# Patient Record
Sex: FEMALE | Race: WHITE | Employment: FULL TIME | URBAN - METROPOLITAN AREA
[De-identification: names, ages, dates, MRNs, and addresses within clinical notes are randomized per-mention and may not be internally consistent; named-entity substitution may affect disease eponyms.]

---

## 2017-01-16 ENCOUNTER — ALLSCRIPTS OFFICE VISIT (OUTPATIENT)
Dept: OTHER | Facility: OTHER | Age: 52
End: 2017-01-16

## 2017-01-20 ENCOUNTER — GENERIC CONVERSION - ENCOUNTER (OUTPATIENT)
Dept: OTHER | Facility: OTHER | Age: 52
End: 2017-01-20

## 2017-02-03 ENCOUNTER — ALLSCRIPTS OFFICE VISIT (OUTPATIENT)
Dept: OTHER | Facility: OTHER | Age: 52
End: 2017-02-03

## 2017-02-27 ENCOUNTER — ALLSCRIPTS OFFICE VISIT (OUTPATIENT)
Dept: OTHER | Facility: OTHER | Age: 52
End: 2017-02-27

## 2017-03-01 DIAGNOSIS — J31.0 CHRONIC RHINITIS: ICD-10-CM

## 2017-03-02 ENCOUNTER — HOSPITAL ENCOUNTER (OUTPATIENT)
Dept: RADIOLOGY | Facility: HOSPITAL | Age: 52
Discharge: HOME | End: 2017-03-02
Payer: COMMERCIAL

## 2017-03-02 DIAGNOSIS — J31.0 CHRONIC RHINITIS: ICD-10-CM

## 2017-03-02 PROCEDURE — 70220 X-RAY EXAM OF SINUSES: CPT

## 2017-03-03 ENCOUNTER — GENERIC CONVERSION - ENCOUNTER (OUTPATIENT)
Dept: OTHER | Facility: OTHER | Age: 52
End: 2017-03-03

## 2017-07-18 ENCOUNTER — TRANSCRIBE ORDERS (OUTPATIENT)
Dept: ADMINISTRATIVE | Facility: HOSPITAL | Age: 52
End: 2017-07-18

## 2017-07-18 ENCOUNTER — APPOINTMENT (OUTPATIENT)
Dept: LAB | Facility: HOSPITAL | Age: 52
End: 2017-07-18
Payer: COMMERCIAL

## 2017-07-18 DIAGNOSIS — K51.919 MILD CHRONIC ULCERATIVE COLITIS, UNSPECIFIED COMPLICATION (HCC): Primary | ICD-10-CM

## 2017-07-18 DIAGNOSIS — K51.919 MILD CHRONIC ULCERATIVE COLITIS, UNSPECIFIED COMPLICATION (HCC): ICD-10-CM

## 2017-07-18 LAB
ALBUMIN SERPL BCP-MCNC: 3.4 G/DL (ref 3.5–5)
ALP SERPL-CCNC: 124 U/L (ref 46–116)
ALT SERPL W P-5'-P-CCNC: 18 U/L (ref 12–78)
ANION GAP SERPL CALCULATED.3IONS-SCNC: 8 MMOL/L (ref 4–13)
AST SERPL W P-5'-P-CCNC: 12 U/L (ref 5–45)
BASOPHILS # BLD AUTO: 0.05 THOUSANDS/ΜL (ref 0–0.1)
BASOPHILS NFR BLD AUTO: 1 % (ref 0–1)
BILIRUB SERPL-MCNC: 0.14 MG/DL (ref 0.2–1)
BUN SERPL-MCNC: 16 MG/DL (ref 5–25)
CALCIUM SERPL-MCNC: 8.4 MG/DL (ref 8.3–10.1)
CHLORIDE SERPL-SCNC: 104 MMOL/L (ref 100–108)
CO2 SERPL-SCNC: 26 MMOL/L (ref 21–32)
CREAT SERPL-MCNC: 0.89 MG/DL (ref 0.6–1.3)
CRP SERPL QL: 18.5 MG/L
EOSINOPHIL # BLD AUTO: 0.31 THOUSAND/ΜL (ref 0–0.61)
EOSINOPHIL NFR BLD AUTO: 3 % (ref 0–6)
ERYTHROCYTE [DISTWIDTH] IN BLOOD BY AUTOMATED COUNT: 20 % (ref 11.6–15.1)
GFR SERPL CREATININE-BSD FRML MDRD: >60 ML/MIN/1.73SQ M
GLUCOSE SERPL-MCNC: 100 MG/DL (ref 65–140)
HCT VFR BLD AUTO: 26 % (ref 34.8–46.1)
HGB BLD-MCNC: 7.4 G/DL (ref 11.5–15.4)
LYMPHOCYTES # BLD AUTO: 2.61 THOUSANDS/ΜL (ref 0.6–4.47)
LYMPHOCYTES NFR BLD AUTO: 26 % (ref 14–44)
MCH RBC QN AUTO: 19.6 PG (ref 26.8–34.3)
MCHC RBC AUTO-ENTMCNC: 28.5 G/DL (ref 31.4–37.4)
MCV RBC AUTO: 69 FL (ref 82–98)
MONOCYTES # BLD AUTO: 0.66 THOUSAND/ΜL (ref 0.17–1.22)
MONOCYTES NFR BLD AUTO: 7 % (ref 4–12)
NEUTROPHILS # BLD AUTO: 6.45 THOUSANDS/ΜL (ref 1.85–7.62)
NEUTS SEG NFR BLD AUTO: 63 % (ref 43–75)
NRBC BLD AUTO-RTO: 0 /100 WBCS
PLATELET # BLD AUTO: 387 THOUSANDS/UL (ref 149–390)
PMV BLD AUTO: 8.6 FL (ref 8.9–12.7)
POTASSIUM SERPL-SCNC: 3.5 MMOL/L (ref 3.5–5.3)
PROT SERPL-MCNC: 7.6 G/DL (ref 6.4–8.2)
RBC # BLD AUTO: 3.78 MILLION/UL (ref 3.81–5.12)
SODIUM SERPL-SCNC: 138 MMOL/L (ref 136–145)
WBC # BLD AUTO: 10.08 THOUSAND/UL (ref 4.31–10.16)

## 2017-07-18 PROCEDURE — 85025 COMPLETE CBC W/AUTO DIFF WBC: CPT

## 2017-07-18 PROCEDURE — 86140 C-REACTIVE PROTEIN: CPT

## 2017-07-18 PROCEDURE — 36415 COLL VENOUS BLD VENIPUNCTURE: CPT

## 2017-07-18 PROCEDURE — 80053 COMPREHEN METABOLIC PANEL: CPT

## 2017-07-20 ENCOUNTER — GENERIC CONVERSION - ENCOUNTER (OUTPATIENT)
Dept: OTHER | Facility: OTHER | Age: 52
End: 2017-07-20

## 2017-09-18 ENCOUNTER — ALLSCRIPTS OFFICE VISIT (OUTPATIENT)
Dept: OTHER | Facility: OTHER | Age: 52
End: 2017-09-18

## 2017-09-18 DIAGNOSIS — Z00.00 ENCOUNTER FOR GENERAL ADULT MEDICAL EXAMINATION WITHOUT ABNORMAL FINDINGS: ICD-10-CM

## 2017-10-11 ENCOUNTER — TRANSCRIBE ORDERS (OUTPATIENT)
Dept: ADMINISTRATIVE | Facility: HOSPITAL | Age: 52
End: 2017-10-11

## 2017-10-11 ENCOUNTER — APPOINTMENT (OUTPATIENT)
Dept: LAB | Facility: HOSPITAL | Age: 52
End: 2017-10-11
Payer: COMMERCIAL

## 2017-10-11 ENCOUNTER — GENERIC CONVERSION - ENCOUNTER (OUTPATIENT)
Dept: OTHER | Facility: OTHER | Age: 52
End: 2017-10-11

## 2017-10-11 DIAGNOSIS — D50.9 NORMOCYTIC HYPOCHROMIC ANEMIA: ICD-10-CM

## 2017-10-11 DIAGNOSIS — E66.01 MORBID OBESITY (HCC): ICD-10-CM

## 2017-10-11 DIAGNOSIS — F45.8 ANXIETY HYPERVENTILATION: ICD-10-CM

## 2017-10-11 DIAGNOSIS — Z00.00 ENCOUNTER FOR GENERAL ADULT MEDICAL EXAMINATION WITHOUT ABNORMAL FINDINGS: ICD-10-CM

## 2017-10-11 DIAGNOSIS — K51.919 MILD CHRONIC ULCERATIVE COLITIS WITH COMPLICATION (HCC): ICD-10-CM

## 2017-10-11 DIAGNOSIS — E66.01 MORBID OBESITY (HCC): Primary | ICD-10-CM

## 2017-10-11 DIAGNOSIS — D50.9 NORMOCYTIC HYPOCHROMIC ANEMIA: Primary | ICD-10-CM

## 2017-10-11 DIAGNOSIS — F41.9 ANXIETY HYPERVENTILATION: ICD-10-CM

## 2017-10-11 LAB
ALBUMIN SERPL BCP-MCNC: 3.4 G/DL (ref 3.5–5)
ALP SERPL-CCNC: 114 U/L (ref 46–116)
ALT SERPL W P-5'-P-CCNC: 17 U/L (ref 12–78)
ANION GAP SERPL CALCULATED.3IONS-SCNC: 5 MMOL/L (ref 4–13)
AST SERPL W P-5'-P-CCNC: 12 U/L (ref 5–45)
BILIRUB SERPL-MCNC: 0.19 MG/DL (ref 0.2–1)
BUN SERPL-MCNC: 14 MG/DL (ref 5–25)
CALCIUM SERPL-MCNC: 8.8 MG/DL (ref 8.3–10.1)
CHLORIDE SERPL-SCNC: 105 MMOL/L (ref 100–108)
CHOLEST SERPL-MCNC: 217 MG/DL (ref 50–200)
CO2 SERPL-SCNC: 29 MMOL/L (ref 21–32)
CORTIS AM PEAK SERPL-MCNC: 13.5 UG/DL (ref 4.2–22.4)
CREAT SERPL-MCNC: 0.89 MG/DL (ref 0.6–1.3)
CRP SERPL QL: 21.9 MG/L
ERYTHROCYTE [DISTWIDTH] IN BLOOD BY AUTOMATED COUNT: 19.3 % (ref 11.6–15.1)
FERRITIN SERPL-MCNC: 9 NG/ML (ref 8–388)
GFR SERPL CREATININE-BSD FRML MDRD: 76 ML/MIN/1.73SQ M
GLUCOSE P FAST SERPL-MCNC: 91 MG/DL (ref 65–99)
HCT VFR BLD AUTO: 39 % (ref 34.8–46.1)
HDLC SERPL-MCNC: 55 MG/DL (ref 40–60)
HGB BLD-MCNC: 11.9 G/DL (ref 11.5–15.4)
INSULIN SERPL-ACNC: 16.3 MU/L (ref 3–25)
IRON SATN MFR SERPL: 10 %
IRON SERPL-MCNC: 34 UG/DL (ref 50–170)
LDLC SERPL CALC-MCNC: 141 MG/DL (ref 0–100)
MCH RBC QN AUTO: 24.9 PG (ref 26.8–34.3)
MCHC RBC AUTO-ENTMCNC: 30.5 G/DL (ref 31.4–37.4)
MCV RBC AUTO: 82 FL (ref 82–98)
PLATELET # BLD AUTO: 353 THOUSANDS/UL (ref 149–390)
PMV BLD AUTO: 9.3 FL (ref 8.9–12.7)
POTASSIUM SERPL-SCNC: 4 MMOL/L (ref 3.5–5.3)
PROT SERPL-MCNC: 7.6 G/DL (ref 6.4–8.2)
RBC # BLD AUTO: 4.78 MILLION/UL (ref 3.81–5.12)
SODIUM SERPL-SCNC: 139 MMOL/L (ref 136–145)
TIBC SERPL-MCNC: 332 UG/DL (ref 250–450)
TRIGL SERPL-MCNC: 107 MG/DL
TSH SERPL DL<=0.05 MIU/L-ACNC: 2.66 UIU/ML (ref 0.36–3.74)
WBC # BLD AUTO: 6.31 THOUSAND/UL (ref 4.31–10.16)

## 2017-10-11 PROCEDURE — 36415 COLL VENOUS BLD VENIPUNCTURE: CPT

## 2017-10-11 PROCEDURE — 85027 COMPLETE CBC AUTOMATED: CPT

## 2017-10-11 PROCEDURE — 83550 IRON BINDING TEST: CPT

## 2017-10-11 PROCEDURE — 83525 ASSAY OF INSULIN: CPT

## 2017-10-11 PROCEDURE — 80061 LIPID PANEL: CPT

## 2017-10-11 PROCEDURE — 82533 TOTAL CORTISOL: CPT

## 2017-10-11 PROCEDURE — 84443 ASSAY THYROID STIM HORMONE: CPT

## 2017-10-11 PROCEDURE — 82728 ASSAY OF FERRITIN: CPT

## 2017-10-11 PROCEDURE — 86140 C-REACTIVE PROTEIN: CPT

## 2017-10-11 PROCEDURE — 83540 ASSAY OF IRON: CPT

## 2017-10-11 PROCEDURE — 80053 COMPREHEN METABOLIC PANEL: CPT

## 2017-10-12 ENCOUNTER — GENERIC CONVERSION - ENCOUNTER (OUTPATIENT)
Dept: OTHER | Facility: OTHER | Age: 52
End: 2017-10-12

## 2017-11-27 ENCOUNTER — GENERIC CONVERSION - ENCOUNTER (OUTPATIENT)
Dept: OTHER | Facility: OTHER | Age: 52
End: 2017-11-27

## 2017-11-30 ENCOUNTER — ALLSCRIPTS OFFICE VISIT (OUTPATIENT)
Dept: OTHER | Facility: OTHER | Age: 52
End: 2017-11-30

## 2017-12-05 NOTE — PROGRESS NOTES
Assessment    1  Acute bronchitis, unspecified organism (466 0) (J20 9)    Plan  Acute bronchitis, unspecified organism    · Azithromycin 250 MG Oral Tablet; TAKE 2 TABLETS ON DAY 1 THEN TAKE 1  TABLET A DAY FOR 4 DAYS   · Montelukast Sodium 10 MG Oral Tablet (Singulair); take 1 tablet by mouth once  daily    Discussion/Summary    See how pt does on meds  Possible side effects of new medications were reviewed with the patient/guardian today  The treatment plan was reviewed with the patient/guardian  The patient/guardian understands and agrees with the treatment plan      Chief Complaint    1  Cough  Patient present today to follow up on her cough  Per patient she is not improving and she is still wheezing occasionally  History of Present Illness  SAÚL Avila presents with complaints of cough  Associated symptoms include wheezing, sore throat and hoarseness, but no fever and no stuffy nose  Review of Systems    Constitutional: feeling poorly, but not feeling tired  ENT: hoarseness, but no nasal discharge  Respiratory: cough and wheezing  Neurological: no headache  Active Problems    1  Acute pharyngitis due to other specified organisms (462) (J02 8)   2  Anxiety (300 00) (F41 9)   3  Dermatitis (692 9) (L30 9)   4  Elevated blood pressure reading (796 2) (R03 0)   5  Encounter for screening mammogram for breast cancer (V76 12) (Z12 31)   6  Morbid obesity with BMI of 40 0-44 9, adult (278 01,V85 41) (E66 01,Z68 41)   7  Other iron deficiency anemia (280 8) (D50 8)   8  Pancolitis (556 6) (K51 00)   9  Rhinitis (472 0) (J31 0)   10  Visual disturbance (368 9) (H53 9)    Past Medical History    1  History of Candidiasis (112 9) (B37 9)    The active problems and past medical history were reviewed and updated today  Surgical History    1  History of  Section   2  History of Excision Of Baker's Cyst   3  History of Knee Surgery   4   History of Laparoscopy (Diagnostic)    The surgical history was reviewed and updated today  Family History  Mother    1  Family history of Hypertension  Father    2  Family history of Diabetes   3  Family history of cataracts (V19 19) (Z83 518)   4  Family history of Hypertension  Brother    5  Family history of glaucoma (V19 11) (Z83 511)    The family history was reviewed and updated today  Social History    · Always uses seat belt   · Former smoker (V15 82) (T73 554)   · Full-time employment   · Lives with family   ·    · No advance directives (V49 89) (Z78 9)   · No living will   · One child   · Denied: Power of  in existence   · [employer]   · Supportive and safe  The social history was reviewed and updated today  Current Meds   1  Apriso 0 375 GM Oral Capsule Extended Release 24 Hour; take 1 capsule daily; Therapy: 20OJR3012 to (Evaluate:27Wxa0963); Last Rx:13Nov2015 Ordered   2  LORazepam 1 MG Oral Tablet; TAKE 1 TABLET BY MOUTH TWICE DAILY AS NEEDED; Therapy: 21DQQ7567 to (Evaluate:24Scj2738)  Requested for: 45Tyx7186; Last   Rx:04Yaz0941 Ordered   3  Prevacid 24HR 15 MG Oral Capsule Delayed Release; TAKE 1 CAPSULE EVERY 12   HOURS Recorded    The medication list was reviewed and updated today  Allergies    1  PredniSONE TABS   2  Lexapro TABS   3  Sulfa Drugs    Vitals  Vital Signs    Recorded: 29XRV8550 02:04PM Recorded: 46XPV1811 01:38PM   Temperature  98 F, Temporal   Heart Rate  82   Respiration  18   Systolic 635 966   Diastolic 92 98   Height  5 ft 3 in   Weight  239 lb 6 oz   BMI Calculated  42 4   BSA Calculated  2 09     Physical Exam    Constitutional   General appearance: Abnormal   acutely ill, obese and appears tired  Ears, Nose, Mouth, and Throat   Otoscopic examination: Tympanic membranes translucent with normal light reflex  Canals patent without erythema  Oropharynx: Normal with no erythema, edema, exudate or lesions      Pulmonary   Auscultation of lungs: Abnormal   diffuse wheezing bilaterally  Cardiovascular   Auscultation of heart: Normal rate and rhythm, normal S1 and S2, without murmurs  Lymphatic   Palpation of lymph nodes in neck: No lymphadenopathy  Signatures   Electronically signed by :  Iris Beasley MD; Nov 30 2017  2:06PM EST                       (Author)

## 2018-01-10 NOTE — PROGRESS NOTES
Assessment    1  Bilateral impacted cerumen (380 4) (H61 23)    Plan  Bilateral impacted cerumen    · Removal Impacted Cerumen one or both ears - POC; Status:Complete;   Done:  04KRN6913   · Follow-up PRN Evaluation and Treatment  Follow-up  Status: Complete  Done:  40RHR2286 03:44PM    Chief Complaint  L ear blocked      History of Present Illness  Ear Fullness: The patient is being seen for an initial evaluation of ear fullness  Symptoms:  ear fullness and ear plugging  Associated symptoms:  no sore throat and no nasal congestion  Review of Systems    Constitutional: no fever and no chills  ENT: hearing loss and nasal discharge, but no earache  Respiratory: no cough  Gastrointestinal: abdominal pain  Neurological: no headache  Active Problems    1  Anxiety (300 00) (F41 9)   2  Dermatitis (692 9) (L30 9)   3  Encounter for screening mammogram for breast cancer (V76 12) (Z12 31)   4  Pancolitis (556 6) (K51 90)   5  URI (upper respiratory infection) (465 9) (J06 9)    Past Medical History    1  History of Candidiasis (112 9) (B37 9)  Active Problems And Past Medical History Reviewed: The active problems and past medical history were reviewed and updated today  Family History    1  Family history of Hypertension    2  Family history of Diabetes   3  Family history of Hypertension  Family History Reviewed: The family history was reviewed and updated today  Social History    · Former smoker (O44 80) (G25 661)   · Quit    · Full-time employment   ·    · One child  The social history was reviewed and updated today  Surgical History    1  History of  Section   2  History of Excision Of Baker's Cyst   3  History of Knee Surgery   4  History of Laparoscopy (Diagnostic)  Surgical History Reviewed: The surgical history was reviewed and updated today  Current Meds   1  Apriso 0 375 GM Oral Capsule Extended Release 24 Hour; take 1 capsule daily;    Therapy: 55BHH4921 to (Evaluate:99Rhm2267); Last Rx:13Nov2015 Ordered   2  LORazepam 1 MG Oral Tablet; TAKE ONE TABLET BY MOUTH TWO TIMES DAILY AS   NEEDED; Therapy: 92LRE3437 to ([facility] [place])  Requested for: 45GJC6697; Last   Rx:30Nov2015 Ordered   3  Prevacid 24HR 15 MG Oral Capsule Delayed Release; TAKE 1 CAPSULE EVERY 12   HOURS Recorded    The medication list was reviewed and updated today  Allergies    1  PredniSONE TABS   2  Lexapro TABS   3  Sulfa Drugs    Vitals   Recorded: 87RUL5327 02:58PM   Temperature 98 7 F   Heart Rate 84   Systolic 097   Diastolic 76   Height 5 ft 3 in   Weight 241 lb    BMI Calculated 42 69   BSA Calculated 2 1     Physical Exam    Constitutional   General appearance: Abnormal   obese  Ears, Nose, Mouth, and Throat   Otoscopic examination: Abnormal   The right tympanic membrane was obscured  The left tympanic membrane was obscured  The right external canal had a cerumen impaction  The left external canal had a cerumen impaction  clear after irrigation  Oropharynx: Normal with no erythema, edema, exudate or lesions  Pulmonary   Auscultation of lungs: Clear to auscultation  Lymphatic   Palpation of lymph nodes in neck: No lymphadenopathy  Procedure            Procedure: cerumen removal    Indication: tympanic membrane(s) could not be visualized in both ears  Procedure Note: The procedure was performed by MA and lasted 10 minute(s)  A otoscope and speculum was placed in the ear canal(s) to visualize the ear canal debris  The ear was cleaned by using warm water irrigation  The procedure was successful  Post-Procedure:   Patient Status: the patient tolerated the procedure well  Complications: there were no complications  Follow-up as needed  Signatures   Electronically signed by :  Karen Weeks MD; Jan 22 2016  3:45PM EST                       (Author)

## 2018-01-12 VITALS
DIASTOLIC BLOOD PRESSURE: 82 MMHG | HEIGHT: 63 IN | BODY MASS INDEX: 42.11 KG/M2 | HEART RATE: 96 BPM | WEIGHT: 237.63 LBS | SYSTOLIC BLOOD PRESSURE: 110 MMHG

## 2018-01-12 VITALS
TEMPERATURE: 99.2 F | HEIGHT: 63 IN | BODY MASS INDEX: 42.11 KG/M2 | SYSTOLIC BLOOD PRESSURE: 110 MMHG | HEART RATE: 90 BPM | WEIGHT: 237.63 LBS | DIASTOLIC BLOOD PRESSURE: 72 MMHG

## 2018-01-13 VITALS
SYSTOLIC BLOOD PRESSURE: 128 MMHG | WEIGHT: 239.38 LBS | DIASTOLIC BLOOD PRESSURE: 92 MMHG | HEART RATE: 82 BPM | HEIGHT: 63 IN | BODY MASS INDEX: 42.41 KG/M2 | RESPIRATION RATE: 18 BRPM | TEMPERATURE: 98 F

## 2018-01-13 VITALS
TEMPERATURE: 97.1 F | SYSTOLIC BLOOD PRESSURE: 122 MMHG | HEIGHT: 63 IN | RESPIRATION RATE: 16 BRPM | BODY MASS INDEX: 41.15 KG/M2 | WEIGHT: 232.25 LBS | HEART RATE: 84 BPM | DIASTOLIC BLOOD PRESSURE: 90 MMHG

## 2018-01-14 VITALS
DIASTOLIC BLOOD PRESSURE: 78 MMHG | HEIGHT: 63 IN | SYSTOLIC BLOOD PRESSURE: 120 MMHG | WEIGHT: 237.63 LBS | BODY MASS INDEX: 42.11 KG/M2 | HEART RATE: 76 BPM

## 2018-01-16 NOTE — RESULT NOTES
Verified Results  * XR SINUSES ROUTINE 3+ VIEWS 82XEN8344 02:38PM Paula Senior Order Number: [id number]     Test Name Result Flag Reference   XR SINUSES ROUTINE 3+ VIEWS (Report)     PARANASAL SINUSES     INDICATION: Dx  chronic rhinitis  Sinus pressure, headache, left ear pain  COMPARISON: Sinus plain films from 7/20/2007     VIEWS: 5     IMAGES: 5     FINDINGS:     No evidence of sinus opacification or air-fluid levels  No lytic or blastic lesions are identified  IMPRESSION:     No evidence of sinusitis         Workstation performed: [id number]     Signed by:   Martinez Simmons MD   3/3/17

## 2018-01-16 NOTE — RESULT NOTES
Verified Results  (1) CORTISOL AM SPECIMEN 28THW0588 07:47AM Emiliano Brow     Test Name Result Flag Reference   CORTISO AM SPEC 13 5 ug/dL  4 2-22 4   Reference ranges established for specimens drawn between 7 and 9 am  Results may be inaccurate if timing is not correct  (1) TSH 11Oct2017 07:37AM Emiliano Brow     Test Name Result Flag Reference   TSH 2 658 uIU/mL  0 358-3 740   This is a patient instruction: This test is non-fasting  Please drink two glasses of water morning of bloodwork  Patients undergoing fluorescein dye angiography may retain small amounts of fluorescein in the body for 48-72 hours post procedure  Samples containing fluorescein can produce falsely depressed TSH values  If the patient had this procedure,a specimen should be resubmitted post fluorescein clearance            The recommended reference ranges for TSH during pregnancy are as follows:  First trimester 0 1 to 2 5 uIU/mL  Second trimester  0 2 to 3 0 uIU/mL  Third trimester 0 3 to 3 0 uIU/m     (1) INSULIN, FASTING 11Oct2017 07:37AM Emiliano Brow     Test Name Result Flag Reference   INSULIN, FASTING 16 3 mU/L  3 0-25 0

## 2018-01-18 NOTE — RESULT NOTES
Verified Results  (1) LIPID PANEL FASTING W DIRECT LDL REFLEX 11Oct2017 07:37AM [employer] Order Number: [id number]     Test Name Result Flag Reference   CHOLESTEROL 217 mg/dL H    LDL CHOLESTEROL CALCULATED 141 mg/dL H 0-100   Triglyceride:        Normal <150 mg/dl   Borderline High 150-199 mg/dl   High 200-499 mg/dl   Very High >499 mg/dl      Cholesterol:       Desirable <200 mg/dl    Borderline High 200-239 mg/dl    High >239 mg/dl      HDL Cholesterol:       High>59 mg/dL    Low <41 mg/dL      HDL Cholesterol:       High>59 mg/dL    Low <41 mg/dL      This screening LDL is a calculated result  It does not have the accuracy of the Direct Measured LDL in the monitoring of patients with hyperlipidemia and/or statin therapy  Direct Measure LDL (FDY675) must be ordered separately in these patients  TRIGLYCERIDES 107 mg/dL  <=150   Specimen collection should occur prior to N-Acetylcysteine or Metamizole administration due to the potential for falsely depressed results  HDL,DIRECT 55 mg/dL  40-60   Specimen collection should occur prior to Metamizole administration due to the potential for falsley depressed results  (1) COMPREHENSIVE METABOLIC PANEL 51DZE9311 27:64BE [employer] Order Number: [id number]     Test Name Result Flag Reference   SODIUM 139 mmol/L  136-145   POTASSIUM 4 0 mmol/L  3 5-5 3   CHLORIDE 105 mmol/L  100-108   CARBON DIOXIDE 29 mmol/L  21-32   ANION GAP (CALC) 5 mmol/L  4-13   BLOOD UREA NITROGEN 14 mg/dL  5-25   CREATININE 0 89 mg/dL  0 60-1 30   Standardized to IDMS reference method   CALCIUM 8 8 mg/dL  8 3-10 1   BILI, TOTAL 0 19 mg/dL L 0 20-1 00   ALK PHOSPHATAS 114 U/L     ALT (SGPT) 17 U/L  12-78   Specimen collection should occur prior to Sulfasalazine administration due to the potential for falsely depressed results     AST(SGOT) 12 U/L  5-45   Specimen collection should occur prior to Sulfasalazine administration due to the potential for falsely depressed results  ALBUMIN 3 4 g/dL L 3 5-5 0   TOTAL PROTEIN 7 6 g/dL  6 4-8 2   eGFR 76 ml/min/1 73sq m     [facility] Disease Education Program recommendations are as follows:  GFR calculation is accurate only with a steady state creatinine  Chronic Kidney disease less than 60 ml/min/1 73 sq  meters  Kidney failure less than 15 ml/min/1 73 sq  meters  GLUCOSE FASTING 91 mg/dL  65-99   Specimen collection should occur prior to Sulfasalazine administration due to the potential for falsely depressed results  Specimen collection should occur prior to Sulfapyridine administration due to the potential for falsely elevated results

## 2018-01-22 VITALS
TEMPERATURE: 98.7 F | HEART RATE: 86 BPM | BODY MASS INDEX: 42.19 KG/M2 | DIASTOLIC BLOOD PRESSURE: 100 MMHG | WEIGHT: 238.13 LBS | RESPIRATION RATE: 16 BRPM | SYSTOLIC BLOOD PRESSURE: 142 MMHG | HEIGHT: 63 IN

## 2018-02-07 ENCOUNTER — TELEPHONE (OUTPATIENT)
Dept: FAMILY MEDICINE CLINIC | Facility: CLINIC | Age: 53
End: 2018-02-07

## 2018-02-07 NOTE — TELEPHONE ENCOUNTER
Her [facility] paperwork  on 2018  She stated she needs for you to approve another year  She also stated you have all the information and paperwork  Additionally, she asked this be done quickly or she will have to go through the entire process again

## 2018-02-12 ENCOUNTER — TELEPHONE (OUTPATIENT)
Dept: FAMILY MEDICINE CLINIC | Facility: CLINIC | Age: 53
End: 2018-02-12

## 2018-02-12 NOTE — TELEPHONE ENCOUNTER
Patient states you need to redo her fmla form because the last one was closed  She will sent form to email

## 2018-02-13 ENCOUNTER — TELEPHONE (OUTPATIENT)
Dept: FAMILY MEDICINE CLINIC | Facility: CLINIC | Age: 53
End: 2018-02-13

## 2018-02-15 ENCOUNTER — TELEPHONE (OUTPATIENT)
Dept: FAMILY MEDICINE CLINIC | Facility: CLINIC | Age: 53
End: 2018-02-15

## 2018-02-15 NOTE — TELEPHONE ENCOUNTER
Patient called in and wanted to inform you that you do not need to fill out her fmla forms  She was on the phone w/ her hr dept and it was a mistake on their end

## 2018-03-15 DIAGNOSIS — F41.9 ANXIETY: Primary | ICD-10-CM

## 2018-03-15 RX ORDER — LORAZEPAM 1 MG/1
TABLET ORAL
Qty: 60 TABLET | Refills: 2 | Status: SHIPPED | OUTPATIENT
Start: 2018-03-15 | End: 2018-06-06 | Stop reason: SDUPTHER

## 2018-03-16 ENCOUNTER — TELEPHONE (OUTPATIENT)
Dept: FAMILY MEDICINE CLINIC | Facility: CLINIC | Age: 53
End: 2018-03-16

## 2018-03-16 NOTE — TELEPHONE ENCOUNTER
I don't have a problem but her daughter does live locally here and if daughter can  that would be preferred, check with Idalmis Hope

## 2018-03-16 NOTE — TELEPHONE ENCOUNTER
Ida Porter called back and stated her daughter stays with her over the weekends so she is not in the area on fri, sat, sun  I called and left message for Ligia Baeza to call me back  If she approves is it okay to fax rx to pharmacy?

## 2018-03-16 NOTE — TELEPHONE ENCOUNTER
Tony Christiansen returned my call  She said it was okay since her daughter was out of the area  I faxed to [facility] in [place], [place] at [phone]  I left a message on pt's vm notifying her it was faxed successfully

## 2018-03-16 NOTE — TELEPHONE ENCOUNTER
Dr Chica Quiroz and Brandon Day-    Are we allowed to make a one time exception to fax patients lorazepam to her pharmacy in [place], [place]? She is working late today and tomorrow and will be out this weekend  Pls advise  Thank you

## 2018-05-18 ENCOUNTER — TELEPHONE (OUTPATIENT)
Dept: FAMILY MEDICINE CLINIC | Facility: CLINIC | Age: 53
End: 2018-05-18

## 2018-05-18 NOTE — TELEPHONE ENCOUNTER
Per [facility] pt  Needs an appt  To complete paperwork that was sent to us  lmctb to schedule appt  Paperwork in brown bin

## 2018-06-06 ENCOUNTER — OFFICE VISIT (OUTPATIENT)
Dept: FAMILY MEDICINE CLINIC | Facility: CLINIC | Age: 53
End: 2018-06-06
Payer: COMMERCIAL

## 2018-06-06 VITALS
TEMPERATURE: 97.2 F | DIASTOLIC BLOOD PRESSURE: 82 MMHG | SYSTOLIC BLOOD PRESSURE: 130 MMHG | BODY MASS INDEX: 41.16 KG/M2 | WEIGHT: 241.13 LBS | HEART RATE: 86 BPM | RESPIRATION RATE: 16 BRPM | HEIGHT: 64 IN

## 2018-06-06 DIAGNOSIS — Z12.31 ENCOUNTER FOR SCREENING MAMMOGRAM FOR BREAST CANCER: Primary | ICD-10-CM

## 2018-06-06 DIAGNOSIS — F41.9 ANXIETY: ICD-10-CM

## 2018-06-06 DIAGNOSIS — E78.49 OTHER HYPERLIPIDEMIA: ICD-10-CM

## 2018-06-06 DIAGNOSIS — K51.00 PANCOLITIS (HCC): ICD-10-CM

## 2018-06-06 PROBLEM — E66.01 MORBID OBESITY WITH BMI OF 40.0-44.9, ADULT (HCC): Status: ACTIVE | Noted: 2017-09-18

## 2018-06-06 PROCEDURE — 99214 OFFICE O/P EST MOD 30 MIN: CPT | Performed by: FAMILY MEDICINE

## 2018-06-06 RX ORDER — LORAZEPAM 1 MG/1
1 TABLET ORAL 2 TIMES DAILY PRN
Qty: 60 TABLET | Refills: 1 | Status: SHIPPED | OUTPATIENT
Start: 2018-06-06 | End: 2018-09-14 | Stop reason: SDUPTHER

## 2018-06-06 RX ORDER — LORAZEPAM 1 MG/1
1 TABLET ORAL 2 TIMES DAILY PRN
COMMUNITY
Start: 2014-08-11 | End: 2018-06-06 | Stop reason: SDUPTHER

## 2018-06-06 RX ORDER — MESALAMINE 375 MG/1
1 CAPSULE, EXTENDED RELEASE ORAL DAILY
COMMUNITY
Start: 2015-11-13 | End: 2019-02-13

## 2018-06-06 RX ORDER — LANSOPRAZOLE 15 MG/1
1 CAPSULE, DELAYED RELEASE ORAL EVERY 12 HOURS
COMMUNITY
End: 2020-02-05 | Stop reason: CLARIF

## 2018-06-06 RX ORDER — AZITHROMYCIN 250 MG/1
TABLET, FILM COATED ORAL DAILY
COMMUNITY
Start: 2017-11-30 | End: 2018-06-06 | Stop reason: CLARIF

## 2018-06-06 RX ORDER — MONTELUKAST SODIUM 10 MG/1
1 TABLET ORAL DAILY
COMMUNITY
Start: 2017-11-30 | End: 2018-06-06 | Stop reason: CLARIF

## 2018-06-06 NOTE — PROGRESS NOTES
Assessment/Plan:    Pancolitis ([address] )  Await lab    Other hyperlipidemia  Await lab       Diagnoses and all orders for this visit:    Encounter for screening mammogram for breast cancer  -     Mammo screening bilateral w 3d & cad; Future    Other hyperlipidemia  -     Lipid Panel with Direct LDL reflex; Future  -     Comprehensive metabolic panel; Future  -     TSH, 3rd generation; Future  -     Urinalysis with microscopic    Pancolitis (HCC)  -     CBC and differential; Future    Anxiety  -     LORazepam (ATIVAN) 1 mg tablet; Take 1 tablet (1 mg total) by mouth 2 (two) times a day as needed for anxiety          Subjective:      Patient ID: Martha Porras is a 50 y o  female  Abdominal Pain   This is a chronic (has pancolitis) problem  The current episode started more than 1 year ago  The onset quality is sudden  The problem occurs intermittently  The problem has been unchanged  The pain is located in the generalized abdominal region  The pain is at a severity of 3/10  The pain is mild  The quality of the pain is colicky  The abdominal pain does not radiate  Associated symptoms include diarrhea, flatus and nausea  Pertinent negatives include no constipation, headaches, vomiting or weight loss  Nothing aggravates the pain  Relieved by: medications  Treatments tried: medication  The treatment provided moderate relief  Prior diagnostic workup includes GI consult, lower endoscopy, upper endoscopy and CT scan  The following portions of the patient's history were reviewed and updated as appropriate: allergies, current medications, past family history, past medical history, past social history, past surgical history and problem list     Review of Systems   Constitutional: Negative for activity change, appetite change, unexpected weight change and weight loss  Respiratory: Negative for shortness of breath  Cardiovascular: Negative for chest pain     Gastrointestinal: Positive for abdominal pain, diarrhea, flatus and nausea  Negative for constipation and vomiting  Neurological: Negative for headaches  Psychiatric/Behavioral: The patient is nervous/anxious  Objective:      BP (!) 146/110 (BP Location: Left arm, Patient Position: Sitting, Cuff Size: Adult)   Pulse 86   Temp (!) 97 2 °F (36 2 °C) (Temporal)   Resp 16   Ht 5' 4 01" (1 626 m)   Wt 109 kg (241 lb 2 oz)   BMI 41 37 kg/m²          Physical Exam   Constitutional: She appears well-developed and well-nourished  obese   Neck: No thyromegaly present  Cardiovascular: Normal rate, regular rhythm and normal heart sounds  Pulmonary/Chest: Breath sounds normal    Abdominal: Soft  Bowel sounds are normal    Lymphadenopathy:     She has no cervical adenopathy  Psychiatric: She has a normal mood and affect

## 2018-07-05 ENCOUNTER — TELEPHONE (OUTPATIENT)
Dept: FAMILY MEDICINE CLINIC | Facility: CLINIC | Age: 53
End: 2018-07-05

## 2018-07-05 NOTE — TELEPHONE ENCOUNTER
F Y I Pt  Is either going to have forms faxed or sent to [facility] email for FMLA that it needs to say she needs to work 8:30-5:00  She stated you have done this for her before

## 2018-07-09 ENCOUNTER — TELEPHONE (OUTPATIENT)
Dept: FAMILY MEDICINE CLINIC | Facility: CLINIC | Age: 53
End: 2018-07-09

## 2018-07-09 NOTE — TELEPHONE ENCOUNTER
Patient will like to know if Dr Pennie Aquino finished her Accomodation paper work  Per patient she has not been able to go to work due to a bad a flare up on her tonsillitis  I did notice Dr Pennie Aquino was working on it on Friday but I could not find it on the brown folder or in any of the folders in the back  Please advice

## 2018-07-09 NOTE — TELEPHONE ENCOUNTER
Patient paperwork is no where to be found do you know If it was put In the bin to return to the front?

## 2018-07-10 NOTE — TELEPHONE ENCOUNTER
Have forms and scanned into chart  It was faxed by myself 07/06/2018  I will call patient to inform her it was faxed on Friday

## 2018-07-10 NOTE — TELEPHONE ENCOUNTER
Left message on cell notifying pt that Dr Oliverio Marie filled forms out on Friday and they were faxed over as well  Advised patient to follow up w/ Miri to see if they received the forms  Instructed to call us back with any questions

## 2018-07-23 ENCOUNTER — TELEPHONE (OUTPATIENT)
Dept: FAMILY MEDICINE CLINIC | Facility: CLINIC | Age: 53
End: 2018-07-23

## 2018-08-01 ENCOUNTER — TELEPHONE (OUTPATIENT)
Dept: FAMILY MEDICINE CLINIC | Facility: CLINIC | Age: 53
End: 2018-08-01

## 2018-08-01 RX ORDER — BUDESONIDE 9 MG/1
TABLET, EXTENDED RELEASE ORAL
Refills: 1 | COMMUNITY
Start: 2018-07-10 | End: 2019-02-13

## 2018-08-01 RX ORDER — DICYCLOMINE HCL 20 MG
TABLET ORAL
Refills: 1 | COMMUNITY
Start: 2018-07-13 | End: 2019-02-13

## 2018-08-01 RX ORDER — MESALAMINE 1000 MG/1
SUPPOSITORY RECTAL
COMMUNITY
Start: 2018-07-24 | End: 2019-02-13

## 2018-08-01 NOTE — TELEPHONE ENCOUNTER
Patient would like to speak w/ you personally regarding her hospital stay for the past 3 days  Please call her back at your convenience today  Thank you

## 2018-08-02 ENCOUNTER — TRANSITIONAL CARE MANAGEMENT (OUTPATIENT)
Dept: FAMILY MEDICINE CLINIC | Facility: CLINIC | Age: 53
End: 2018-08-02

## 2018-08-08 ENCOUNTER — TELEPHONE (OUTPATIENT)
Dept: FAMILY MEDICINE CLINIC | Facility: CLINIC | Age: 53
End: 2018-08-08

## 2018-08-08 NOTE — TELEPHONE ENCOUNTER
Pt came in and dropped off her disability forms, they need to be completed before 8/14 so that she can receive her benefits, only certain pages need to be faxed so please be careful with sending them over and that we are faxing the correct info  Putting in [address] bin for her to do

## 2018-08-09 NOTE — TELEPHONE ENCOUNTER
Pt was advised disability forms were completed, faxed, copied, scanned into chart and mailed to pt home address

## 2018-09-05 ENCOUNTER — OFFICE VISIT (OUTPATIENT)
Dept: FAMILY MEDICINE CLINIC | Facility: CLINIC | Age: 53
End: 2018-09-05
Payer: COMMERCIAL

## 2018-09-05 VITALS
SYSTOLIC BLOOD PRESSURE: 128 MMHG | WEIGHT: 229 LBS | HEIGHT: 64 IN | HEART RATE: 80 BPM | TEMPERATURE: 98.6 F | BODY MASS INDEX: 39.09 KG/M2 | DIASTOLIC BLOOD PRESSURE: 88 MMHG | RESPIRATION RATE: 16 BRPM

## 2018-09-05 DIAGNOSIS — K51.00 PANCOLITIS (HCC): Primary | ICD-10-CM

## 2018-09-05 PROCEDURE — 3008F BODY MASS INDEX DOCD: CPT | Performed by: FAMILY MEDICINE

## 2018-09-05 PROCEDURE — 99213 OFFICE O/P EST LOW 20 MIN: CPT | Performed by: FAMILY MEDICINE

## 2018-09-05 PROCEDURE — 1036F TOBACCO NON-USER: CPT | Performed by: FAMILY MEDICINE

## 2018-09-05 NOTE — PROGRESS NOTES
Assessment/Plan:    Pancolitis ([address] )  Started Humira 8/16 -still waiting for it to kick in (will be up to maintenance in 2 [id number] would like to stay out until 10/1/18       Diagnoses and all orders for this visit:    Pancolitis ([address] )    Other orders  -     adalimumab (HUMIRA) 40 mg/0 8 mL PSKT; Inject 40 mg under the skin once          Subjective:      Patient ID: Jesusita More is a 48 y o  female  F/u colitis-on Humira, still with diarrhea and rectal bleeding, f/u end of September with GI        The following portions of the patient's history were reviewed and updated as appropriate: allergies, current medications, past family history, past medical history, past social history, past surgical history and problem list       Review of Systems      Objective:      /88 (BP Location: Right arm, Patient Position: Sitting, Cuff Size: Standard)   Pulse 80   Temp 98 6 °F (37 °C) (Temporal)   Resp 16   Ht 5' 4 01" (1 626 m)   Wt 104 kg (229 lb)   BMI 39 30 kg/m²          Physical Exam   Constitutional: She appears well-developed and well-nourished  Neck: No thyromegaly present  Cardiovascular: Normal rate, regular rhythm and normal heart sounds  Pulmonary/Chest: Breath sounds normal    Abdominal: There is no tenderness  Musculoskeletal: She exhibits no edema  Lymphadenopathy:     She has no cervical adenopathy  Vitals reviewed

## 2018-09-05 NOTE — ASSESSMENT & PLAN NOTE
Started Humira 8/16 -still waiting for it to kick in (will be up to maintenance in 2 [id number] would like to stay out until 10/1/18

## 2018-09-14 DIAGNOSIS — F41.9 ANXIETY: ICD-10-CM

## 2018-09-14 RX ORDER — LORAZEPAM 1 MG/1
TABLET ORAL
Qty: 60 TABLET | Refills: 2 | Status: SHIPPED | OUTPATIENT
Start: 2018-09-14 | End: 2019-03-12 | Stop reason: SDUPTHER

## 2018-09-20 ENCOUNTER — TELEPHONE (OUTPATIENT)
Dept: FAMILY MEDICINE CLINIC | Facility: CLINIC | Age: 53
End: 2018-09-20

## 2018-09-25 ENCOUNTER — TELEPHONE (OUTPATIENT)
Dept: FAMILY MEDICINE CLINIC | Facility: CLINIC | Age: 53
End: 2018-09-25

## 2018-09-25 NOTE — TELEPHONE ENCOUNTER
Pt was advised  Pt stated that Karine Lopez had already received copies of her ov additional to completed paperwork  Pt does request for Dr Adis Looney to review form to see if any other additional info is needed

## 2018-09-25 NOTE — TELEPHONE ENCOUNTER
Pt called following up on FMLA paperwork sent by [facility] emailed to [employer]  As per pt paperwork is due on 9/27/18  Pt states paperwork has been resent to [employer]  Pls advise

## 2018-09-26 ENCOUNTER — TELEPHONE (OUTPATIENT)
Dept: FAMILY MEDICINE CLINIC | Facility: CLINIC | Age: 53
End: 2018-09-26

## 2018-09-26 LAB
BASOPHILS # BLD AUTO: 0 X10E3/UL (ref 0–0.2)
BASOPHILS NFR BLD AUTO: 1 %
CRP SERPL-MCNC: 26.7 MG/L (ref 0–4.9)
EOSINOPHIL # BLD AUTO: 0.1 X10E3/UL (ref 0–0.4)
EOSINOPHIL NFR BLD AUTO: 3 %
ERYTHROCYTE [DISTWIDTH] IN BLOOD BY AUTOMATED COUNT: 16.9 % (ref 12.3–15.4)
ERYTHROCYTE [SEDIMENTATION RATE] IN BLOOD BY WESTERGREN METHOD: 45 MM/HR (ref 0–40)
HCT VFR BLD AUTO: 31.4 % (ref 34–46.6)
HGB BLD-MCNC: 9.5 G/DL (ref 11.1–15.9)
IMM GRANULOCYTES # BLD: 0 X10E3/UL (ref 0–0.1)
IMM GRANULOCYTES NFR BLD: 0 %
LYMPHOCYTES # BLD AUTO: 2.5 X10E3/UL (ref 0.7–3.1)
LYMPHOCYTES NFR BLD AUTO: 47 %
MCH RBC QN AUTO: 24.4 PG (ref 26.6–33)
MCHC RBC AUTO-ENTMCNC: 30.3 G/DL (ref 31.5–35.7)
MCV RBC AUTO: 81 FL (ref 79–97)
MONOCYTES # BLD AUTO: 0.4 X10E3/UL (ref 0.1–0.9)
MONOCYTES NFR BLD AUTO: 8 %
NEUTROPHILS # BLD AUTO: 2.1 X10E3/UL (ref 1.4–7)
NEUTROPHILS NFR BLD AUTO: 41 %
PLATELET # BLD AUTO: 356 X10E3/UL (ref 150–379)
RBC # BLD AUTO: 3.9 X10E6/UL (ref 3.77–5.28)
WBC # BLD AUTO: 5.1 X10E3/UL (ref 3.4–10.8)

## 2018-09-26 NOTE — TELEPHONE ENCOUNTER
----- Message from Owen Gonzalez MD sent at 9/26/2018  3:29 PM EDT -----  Still anemic with inflammation-when is f/u w/GI?

## 2018-09-26 NOTE — TELEPHONE ENCOUNTER
The form that we received yesterday was requesting the same info, office notes and testing done 9/5 to current date  I faxed the office note over to [facility] because that was all that was available, I called Hellen Taras and she stated she went yesterday to go get her labs done because she had to be on her meds prior to the blood draw  She also stated that she doesn't see Gastro until 09/28 and that Skyler Sal wants those notes as well  She said she needed anything further she would give us a call  I scanned the forms under media along with the fax conformation

## 2019-01-03 ENCOUNTER — TELEPHONE (OUTPATIENT)
Dept: FAMILY MEDICINE CLINIC | Facility: CLINIC | Age: 54
End: 2019-01-03

## 2019-01-03 DIAGNOSIS — E78.5 HYPERLIPIDEMIA, UNSPECIFIED HYPERLIPIDEMIA TYPE: Primary | ICD-10-CM

## 2019-01-03 NOTE — TELEPHONE ENCOUNTER
Can you put in routine  labs for her to get done before her next visit on 1/30 will ,mail them to the patient

## 2019-02-13 ENCOUNTER — ANNUAL EXAM (OUTPATIENT)
Dept: FAMILY MEDICINE CLINIC | Facility: CLINIC | Age: 54
End: 2019-02-13
Payer: COMMERCIAL

## 2019-02-13 VITALS
RESPIRATION RATE: 16 BRPM | BODY MASS INDEX: 41.86 KG/M2 | HEART RATE: 83 BPM | HEIGHT: 64 IN | WEIGHT: 245.2 LBS | SYSTOLIC BLOOD PRESSURE: 130 MMHG | TEMPERATURE: 98.6 F | DIASTOLIC BLOOD PRESSURE: 86 MMHG | OXYGEN SATURATION: 98 %

## 2019-02-13 DIAGNOSIS — Z12.39 SCREENING FOR BREAST CANCER: ICD-10-CM

## 2019-02-13 DIAGNOSIS — E66.01 MORBID OBESITY (HCC): ICD-10-CM

## 2019-02-13 DIAGNOSIS — G89.29 CHRONIC PAIN OF BOTH KNEES: ICD-10-CM

## 2019-02-13 DIAGNOSIS — Z01.419 ENCOUNTER FOR GYNECOLOGICAL EXAMINATION WITHOUT ABNORMAL FINDING: Primary | ICD-10-CM

## 2019-02-13 DIAGNOSIS — M25.562 CHRONIC PAIN OF BOTH KNEES: ICD-10-CM

## 2019-02-13 DIAGNOSIS — M25.561 CHRONIC PAIN OF BOTH KNEES: ICD-10-CM

## 2019-02-13 PROCEDURE — 99396 PREV VISIT EST AGE 40-64: CPT | Performed by: FAMILY MEDICINE

## 2019-02-13 PROCEDURE — G0145 SCR C/V CYTO,THINLAYER,RESCR: HCPCS | Performed by: FAMILY MEDICINE

## 2019-02-13 NOTE — PROGRESS NOTES
Assessment/Plan:    No problem-specific Assessment & Plan notes found for this encounter  Diagnoses and all orders for this visit:    Encounter for gynecological examination without abnormal finding  -     Liquid-based pap, screening    Chronic pain of both knees    Morbid obesity ([facility] [address] )          Subjective:      Patient ID: Bernard Velez is a 48 y o  female  HPI    The following portions of the patient's history were reviewed and updated as appropriate: allergies, current medications, past family history, past medical history, past social history, past surgical history and problem list     Review of Systems      Objective:  Chief Complaint   Patient presents with    Follow-up     med check     Gynecologic Exam       Assessment             Plan      Await pap smear results  reviewed Breast self exam,counselled about weight  Calcium 1200 mg and Vitamin D 1,000 units daily intake recommended  Weight bearing exercise advised to help prevent osteoporosis and maintain muscles  Subjective      Bernard Velez is a 48 y o  female who presents for annual exam  Periods are irregular, lasting 4 days  Dysmenorrhea:none  Cyclic symptoms include irritability  No intermenstrual bleeding, spotting, or discharge  The patient reports that there is not domestic violence in her life  Current contraception: none  History of abnormal Pap smear: yes - 1991  Family history of uterine or ovarian cancer: pat great aunt with ovarian  Regular self breast exam: no  History of abnormal mammogram: no  Family history of breast cancer: yes - father's cousin  History of abnormal lipids: yes -   Menstrual History:  OB History    None        Menarche age: 17  No LMP recorded  10/18       The following portions of the patient's history were reviewed and updated as appropriate: allergies, current medications, past family history, past medical history, past social history, past surgical history and problem list       Review of Systems  Constitutional: positive for fatigue  Respiratory: negative  Cardiovascular: negative  Genitourinary:positive for irregular periods  Integument/breast: negative  Behavioral/Psych: positive for anxiety    bilat knee pain  Objective      /86   Pulse 83   Temp 98 6 °F (37 °C) (Temporal)   Resp 16   Ht 5' 4" (1 626 m)   Wt 111 kg (245 lb 3 2 oz)   SpO2 98%   BMI 42 09 kg/m²     General:   alert and oriented, in no acute distress, appears stated age and cooperative   Heart: regular rate and rhythm, S1, S2 normal, no murmur, click, rub or gallop   Lungs: clear to auscultation bilaterally   Abdomen: soft, non-tender, without masses or organomegaly   Vulva: normal   Vagina: normal mucosa   Cervix: anteverted, multiparous appearance, no bleeding following Pap and no lesions   Uterus: normal size, normal shape and consistency   Adnexa: normal adnexa   Thyroid: Normal  Breast: normal appearance, no masses or tenderness, No axillary or supraclavicular adenopathy, Normal to palpation without dominant masses              /86   Pulse 83   Temp 98 6 °F (37 °C) (Temporal)   Resp 16   Ht 5' 4" (1 626 m)   Wt 111 kg (245 lb 3 2 oz)   SpO2 98%   BMI 42 09 kg/m²          Physical Exam    BMI Counseling: Body mass index is 42 09 kg/m²  Discussed the patient's BMI with her  The BMI is above average  BMI counseling and education was provided to the patient  Nutrition recommendations include reducing portion sizes, decreasing overall calorie intake, 3-5 servings of fruits/vegetables daily, reducing fast food intake and consuming healthier snacks  Exercise recommendations include exercising 3-5 times per week

## 2019-02-13 NOTE — PATIENT INSTRUCTIONS
Await pap and mammo  Obesity   AMBULATORY CARE:   Obesity  is when your body mass index (BMI) is greater than 30  Your healthcare provider will use your height and weight to measure your BMI  The risks of obesity include  many health problems, such as injuries or physical disability  You may need tests to check for the following:  · Diabetes     · High blood pressure or high cholesterol     · Heart disease     · Gallbladder or liver disease     · Cancer of the colon, breast, prostate, liver, or kidney     · Sleep apnea     · Arthritis or gout  Seek care immediately if:   · You have a severe headache, confusion, or difficulty speaking  · You have weakness on one side of your body  · You have chest pain, sweating, or shortness of breath  Contact your healthcare provider if:   · You have symptoms of gallbladder or liver disease, such as pain in your upper abdomen  · You have knee or hip pain and discomfort while walking  · You have symptoms of diabetes, such as intense hunger and thirst, and frequent urination  · You have symptoms of sleep apnea, such as snoring or daytime sleepiness  · You have questions or concerns about your condition or care  Treatment for obesity  focuses on helping you lose weight to improve your health  Even a small decrease in BMI can reduce the risk for many health problems  Your healthcare provider will help you set a weight-loss goal   · Lifestyle changes  are the first step in treating obesity  These include making healthy food choices and getting regular physical activity  Your healthcare provider may suggest a weight-loss program that involves coaching, education, and therapy  · Medicine  may help you lose weight when it is used with a healthy diet and physical activity  · Surgery  can help you lose weight if you are very obese and have other health problems  There are several types of weight-loss surgery   Ask your healthcare provider for more information  Be successful losing weight:   · Set small, realistic goals  An example of a small goal is to walk for 20 minutes 5 days a week  Anther goal is to lose 5% of your body weight  · Tell friends, family members, and coworkers about your goals  and ask for their support  Ask a friend to lose weight with you, or join a weight-loss support group  · Identify foods or triggers that may cause you to overeat , and find ways to avoid them  Remove tempting high-calorie foods from your home and workplace  Place a bowl of fresh fruit on your kitchen counter  If stress causes you to eat, then find other ways to cope with stress  · Keep a diary to track what you eat and drink  Also write down how many minutes of physical activity you do each day  Weigh yourself once a week and record it in your diary  Eating changes: You will need to eat 500 to 1,000 fewer calories each day than you currently eat to lose 1 to 2 pounds a week  The following changes will help you cut calories:  · Eat smaller portions  Use small plates, no larger than 9 inches in diameter  Fill your plate half full of fruits and vegetables  Measure your food using measuring cups until you know what a serving size looks like  · Eat 3 meals and 1 or 2 snacks each day  Plan your meals in advance  [place] and eat at home most of the time  Eat slowly  · Eat fruits and vegetables at every meal   They are low in calories and high in fiber, which makes you feel full  Do not add butter, margarine, or cream sauce to vegetables  Use herbs to season steamed vegetables  · Eat less fat and fewer fried foods  Eat more baked or grilled chicken and fish  These protein sources are lower in calories and fat than red meat  Limit fast food  Dress your salads with olive oil and vinegar instead of bottled dressing  · Limit the amount of sugar you eat  Do not drink sugary beverages  Limit alcohol    Activity changes:  Physical activity is good for your body in many ways  It helps you burn calories and build strong muscles  It decreases stress and depression, and improves your mood  It can also help you sleep better  Talk to your healthcare provider before you begin an exercise program   · Exercise for at least 30 minutes 5 days a week  Start slowly  Set aside time each day for physical activity that you enjoy and that is convenient for you  It is best to do both weight training and an activity that increases your heart rate, such as walking, bicycling, or swimming  · Find ways to be more active  Do yard work and housecleaning  Walk up the stairs instead of using elevators  Spend your leisure time going to events that require walking, such as outdoor festivals or fairs  This extra physical activity can help you lose weight and keep it off  Follow up with your healthcare provider as directed: You may need to meet with a dietitian  Write down your questions so you remember to ask them during your visits  © 2017 [address] Information is for End User's use only and may not be sold, redistributed or otherwise used for commercial purposes  All illustrations and images included in CareNotes® are the copyrighted property of [employer]  or [place]  The above information is an  only  It is not intended as medical advice for individual conditions or treatments  Talk to your doctor, nurse or pharmacist before following any medical regimen to see if it is safe and effective for you  Weight Management   AMBULATORY CARE:   Why it is important to manage your weight:  Being overweight increases your risk of health conditions such as heart disease, high blood pressure, type 2 diabetes, and certain types of cancer  It can also increase your risk for osteoarthritis, sleep apnea, and other respiratory problems  Aim for a slow, steady weight loss   Even a small amount of weight loss can lower your risk of health problems  How to lose weight safely:  A safe and healthy way to lose weight is to eat fewer calories and get regular exercise  You can lose up about 1 pound a week by decreasing the number of calories you eat by 500 calories each day  You can decrease calories by eating smaller portion sizes or by cutting out high-calorie foods  Read labels to find out how many calories are in the foods you eat  You can also burn calories with exercise such as walking, swimming, or biking  You will be more likely to keep weight off if you make these changes part of your lifestyle  Healthy meal plan for weight management:  A healthy meal plan includes a variety of foods, contains fewer calories, and helps you stay healthy  A healthy meal plan includes the following:  · Eat whole-grain foods more often  A healthy meal plan should contain fiber  Fiber is the part of grains, fruits, and vegetables that is not broken down by your body  Whole-grain foods are healthy and provide extra fiber in your diet  Some examples of whole-grain foods are whole-wheat breads and pastas, oatmeal, brown rice, and bulgur  · Eat a variety of vegetables every day  Include dark, leafy greens such as spinach, kale, devonte greens, and mustard greens  Eat yellow and orange vegetables such as carrots, sweet potatoes, and winter squash  · Eat a variety of fruits every day  Choose fresh or canned fruit (canned in its own juice or light syrup) instead of juice  Fruit juice has very little or no fiber  · Eat low-fat dairy foods  Drink fat-free (skim) milk or 1% milk  Eat fat-free yogurt and low-fat cottage cheese  Try low-fat cheeses such as mozzarella and other reduced-fat cheeses  · Choose meat and other protein foods that are low in fat  Choose beans or other legumes such as split peas or lentils  Choose fish, skinless poultry (chicken or turkey), or lean cuts of red meat (beef or pork)   Before you cook meat or poultry, cut off any visible fat      · Use less fat and oil  Try baking foods instead of frying them  Add less fat, such as margarine, sour cream, regular salad dressing and mayonnaise to foods  Eat fewer high-fat foods  Some examples of high-fat foods include french fries, doughnuts, ice cream, and cakes  · Eat fewer sweets  Limit foods and drinks that are high in sugar  This includes candy, cookies, regular soda, and sweetened drinks  Ways to decrease calories:   · Eat smaller portions  ¨ Use a small plate with smaller servings  ¨ Do not eat second helpings  ¨ When you eat at a restaurant, ask for a box and place half of your meal in the box before you eat  ¨ Share an entrée with someone else  · Replace high-calorie snacks with healthy, low-calorie snacks  ¨ Choose fresh fruit, vegetables, fat-free rice cakes, or air-popped popcorn instead of potato chips, nuts, or chocolate  ¨ Choose water or calorie-free drinks instead of soda or sweetened drinks  · Eat regular meals  Skipping meals can lead to overeating later in the day  Eat a healthy snack in place of a meal if you do not have time to eat a regular meal      · Do not shop for groceries when you are hungry  You may be more likely to make unhealthy food choices  Take a grocery list of healthy foods and shop after you have eaten  Exercise:  Exercise at least 30 minutes per day on most days of the week  Some examples of exercise include walking, biking, dancing, and swimming  You can also fit in more physical activity by taking the stairs instead of the elevator or parking farther away from stores  Ask your healthcare provider about the best exercise plan for you  Other things to consider as you try to lose weight:   · Be aware of situations that may give you the urge to overeat, such as eating while watching television  Find ways to avoid these situations  For example, read a book, go for a walk, or do crafts      · Meet with a weight loss support group or friends who are also trying to lose weight  This may help you stay motivated to continue working on your weight loss goals  © 2017 [address] Information is for End User's use only and may not be sold, redistributed or otherwise used for commercial purposes  All illustrations and images included in CareNotes® are the copyrighted property of [employer]  or [place]  The above information is an  only  It is not intended as medical advice for individual conditions or treatments  Talk to your doctor, nurse or pharmacist before following any medical regimen to see if it is safe and effective for you

## 2019-02-14 ENCOUNTER — TELEPHONE (OUTPATIENT)
Dept: FAMILY MEDICINE CLINIC | Facility: CLINIC | Age: 54
End: 2019-02-14

## 2019-02-14 NOTE — TELEPHONE ENCOUNTER
Pt called requesting for medical release form to be emailed for completion  Pt was made aware we do not email  Pt was advised that we will mail out the form and she can fax it or mailed back  Form mailed 2/14/18

## 2019-02-18 ENCOUNTER — TELEPHONE (OUTPATIENT)
Dept: FAMILY MEDICINE CLINIC | Facility: CLINIC | Age: 54
End: 2019-02-18

## 2019-02-18 LAB
LAB AP GYN PRIMARY INTERPRETATION: NORMAL
Lab: NORMAL

## 2019-03-04 ENCOUNTER — TELEPHONE (OUTPATIENT)
Dept: FAMILY MEDICINE CLINIC | Facility: CLINIC | Age: 54
End: 2019-03-04

## 2019-03-04 NOTE — TELEPHONE ENCOUNTER
I spoke to the patient, she states she needs this done as part of her wellness visit for her employer  She is going to speak to her employer about this

## 2019-03-04 NOTE — TELEPHONE ENCOUNTER
Patient is requesting a script to get her A1C checked  Please sumbit  One done she will like us to mail it to her

## 2019-03-12 DIAGNOSIS — F41.9 ANXIETY: ICD-10-CM

## 2019-03-12 RX ORDER — LORAZEPAM 1 MG/1
TABLET ORAL
Qty: 60 TABLET | Refills: 0 | Status: SHIPPED | OUTPATIENT
Start: 2019-03-12 | End: 2019-05-09 | Stop reason: SDUPTHER

## 2019-05-09 DIAGNOSIS — F41.9 ANXIETY: ICD-10-CM

## 2019-05-10 RX ORDER — LORAZEPAM 1 MG/1
TABLET ORAL
Qty: 60 TABLET | Refills: 2 | Status: SHIPPED | OUTPATIENT
Start: 2019-05-10 | End: 2019-08-19 | Stop reason: SDUPTHER

## 2019-08-19 ENCOUNTER — OFFICE VISIT (OUTPATIENT)
Dept: FAMILY MEDICINE CLINIC | Facility: CLINIC | Age: 54
End: 2019-08-19
Payer: COMMERCIAL

## 2019-08-19 VITALS
HEIGHT: 64 IN | BODY MASS INDEX: 39.09 KG/M2 | SYSTOLIC BLOOD PRESSURE: 134 MMHG | DIASTOLIC BLOOD PRESSURE: 80 MMHG | WEIGHT: 229 LBS | HEART RATE: 86 BPM

## 2019-08-19 DIAGNOSIS — K51.00 PANCOLITIS (HCC): Primary | ICD-10-CM

## 2019-08-19 DIAGNOSIS — F41.9 ANXIETY: ICD-10-CM

## 2019-08-19 DIAGNOSIS — E66.01 MORBID OBESITY WITH BMI OF 40.0-44.9, ADULT (HCC): ICD-10-CM

## 2019-08-19 PROCEDURE — 3008F BODY MASS INDEX DOCD: CPT | Performed by: FAMILY MEDICINE

## 2019-08-19 PROCEDURE — 1036F TOBACCO NON-USER: CPT | Performed by: FAMILY MEDICINE

## 2019-08-19 PROCEDURE — 99213 OFFICE O/P EST LOW 20 MIN: CPT | Performed by: FAMILY MEDICINE

## 2019-08-19 RX ORDER — BUDESONIDE 9 MG/1
TABLET, EXTENDED RELEASE ORAL
Refills: 1 | COMMUNITY
Start: 2019-08-16 | End: 2019-11-29 | Stop reason: ALTCHOICE

## 2019-08-19 RX ORDER — LORAZEPAM 1 MG/1
1 TABLET ORAL 2 TIMES DAILY PRN
Qty: 60 TABLET | Refills: 2 | Status: SHIPPED | OUTPATIENT
Start: 2019-08-19 | End: 2020-03-16

## 2019-08-19 NOTE — PROGRESS NOTES
Assessment and Plan:    Problem List Items Addressed This Visit        Digestive    Pancolitis [facility]) - Primary     Followed by GI            Other    Morbid obesity with BMI of 40 0-44 9, adult ([address] )     Has lost 23 lb                      Diagnoses and all orders for this visit:    Pancolitis ([facility] )    Morbid obesity with BMI of 40 0-44 9, adult ([facility])    Anxiety  -     LORazepam (ATIVAN) 1 mg tablet; Take 1 tablet (1 mg total) by mouth 2 (two) times a day as needed for anxiety    Other orders  -     UCERIS 9 MG TB24; TK 1 T PO QD              Subjective:      Patient ID: Nadege Beltran is a 48 y o  female  CC:    Chief Complaint   Patient presents with    Hyperlipidemia    Anxiety    GI Problem     Pt had testing done through her gastroenterologist   She also has forms to be completed  - [facility]       HPI:    Having bad flare up-seeing GI later this afternoon, having rectal pain      The following portions of the patient's history were reviewed and updated as appropriate: allergies, current medications, past family history, past medical history, past social history, past surgical history and problem list       Review of Systems   Constitutional: Negative for activity change, appetite change and unexpected weight change  Respiratory: Negative for shortness of breath  Cardiovascular: Negative for chest pain  Gastrointestinal: Positive for abdominal pain and rectal pain  Genitourinary: Positive for dysuria  Negative for difficulty urinating  Psychiatric/Behavioral: The patient is nervous/anxious  Data to review:       Objective:    Vitals:    08/19/19 1143   BP: 134/80   BP Location: Left arm   Patient Position: Sitting   Cuff Size: Large   Pulse: 86   Weight: 104 kg (229 lb)   Height: 5' 4" (1 626 m)        Physical Exam   Constitutional: She appears well-developed and well-nourished  Neck: No thyromegaly present  Cardiovascular: Normal rate, regular rhythm and normal heart sounds  Abdominal: Soft  Bowel sounds are normal  She exhibits distension  There is tenderness  Mild diffuse tenderness   Musculoskeletal: She exhibits no edema  Lymphadenopathy:     She has no cervical adenopathy  Vitals reviewed

## 2019-09-03 ENCOUNTER — TELEPHONE (OUTPATIENT)
Dept: FAMILY MEDICINE CLINIC | Facility: CLINIC | Age: 54
End: 2019-09-03

## 2019-09-03 NOTE — TELEPHONE ENCOUNTER
Patient needs to speak to Dr Rohan Amanda about going out on disability  She lives an hour away and is entirely too sick and in too much pain to come to see her  She would like a call please

## 2019-09-03 NOTE — TELEPHONE ENCOUNTER
Pt now has antibodies against Humira and GI needs to get approval for new med, pt unable to work with colitis acting up, will fill out disability until 10/7

## 2019-09-04 ENCOUNTER — TELEPHONE (OUTPATIENT)
Dept: OTHER | Facility: OTHER | Age: 54
End: 2019-09-04

## 2019-09-16 ENCOUNTER — TELEPHONE (OUTPATIENT)
Dept: FAMILY MEDICINE CLINIC | Facility: CLINIC | Age: 54
End: 2019-09-16

## 2019-09-20 ENCOUNTER — TELEPHONE (OUTPATIENT)
Dept: FAMILY MEDICINE CLINIC | Facility: CLINIC | Age: 54
End: 2019-09-20

## 2019-09-20 NOTE — TELEPHONE ENCOUNTER
Patient would like to speak to you  "she is in [facility] and is going to have her Colon removed  She would like to update you regarding everything that has been going on   You may reach her at [phone]

## 2019-10-03 ENCOUNTER — TELEPHONE (OUTPATIENT)
Dept: FAMILY MEDICINE CLINIC | Facility: CLINIC | Age: 54
End: 2019-10-03

## 2019-10-03 NOTE — TELEPHONE ENCOUNTER
Pt said the hospital already took care of that  The pt wants you to fill out an authorization form for [facility] for the insurance  Daughter is a CNA and wants to be paid to take care of mom

## 2019-10-14 NOTE — TELEPHONE ENCOUNTER
I spoke with patient and she is back in the hospital since Friday   She states she needs her disability extended to probably the end of they year~cd

## 2019-10-21 ENCOUNTER — TELEPHONE (OUTPATIENT)
Dept: FAMILY MEDICINE CLINIC | Facility: CLINIC | Age: 54
End: 2019-10-21

## 2019-10-21 NOTE — TELEPHONE ENCOUNTER
Patient would like to speak to Dr Simeon Ordoñez  She has been in the hospital for over a month  She had her colon removed  She wanted to know if she could just speak to her instead of coming in

## 2019-10-21 NOTE — TELEPHONE ENCOUNTER
Discharged last Wednesday-has drain due to infection and on Cipro, sees surgeon 10/29, had PT/OT and home nurses, would like to come here on 10/29-please squeeze in, needs to be out until new year

## 2019-10-25 NOTE — TELEPHONE ENCOUNTER
PATIENT CALLED IN STATED [employer] FAXED DISABILITY FOR DR Cindi Palencia TO COMPLETE TO EXTEND HER DISABILITY ADVISED FORMS WERE NOT RECEIVED PROVIDED FAX# TO PT TO HAVE [employer] FORMS

## 2019-10-31 NOTE — TELEPHONE ENCOUNTER
Dr Benita Mayen,  According to Merlin Stai, they([employer]ability [employer]) are waiting for a payment before they release records  That is the hold up  Any further questions please see Merlin Stai or Mani Chino

## 2019-10-31 NOTE — TELEPHONE ENCOUNTER
Patient called very worked up stating that Uriel Larsen has been faxing her disability forms to us for the last 10 days  To date, we DO NOT have anything documented that we have received them  We have received the RADHA for the records pertaining to this claim, but not the actual forms  I gave the patient both our internal and central fax numbers, in addition to asking her to have Uriel Larsen call us to try to rectify this issue  Patient became very agitated and started to cry stating that she will have no job protection if we do not get this done for her by 11/04/2019  Asked if someone can bring the forms over to our office, she states that she lives an hour away  Also, she wanted to know why Dr Mikie Beverly can not just print out her older forms and change the dates  Informed patient that we are able to alter previous document

## 2019-11-01 NOTE — TELEPHONE ENCOUNTER
So much has happened to her that new forms are needed-maybe would have better luck having GI or surgery fill out, I have no idea why forms are not being received, can Bhanu Magaña look into this-maybe forms can be emailed to her??

## 2019-11-01 NOTE — TELEPHONE ENCOUNTER
PATIENT CALLED IN REALLY CONCERNED THAT FORMS HAVE NOT BEEN RECEIVED PT STATED Scott Abreu TRIED TO CONTACT OUR OFFICE & WAS ON HOLD FOR 5 MINUTES PT ASKED IF THEIR WAS AN EMAIL THAT CAN BE PROVIDED TO Scott Abreu IN ORDER TO TRY SENDING FORMS VIA EMAIL  SPOKE TO HILDA ADVISED IT WAS OKAY TO PROVIDE HER EMAIL TO PT  PT STATED SHE WILL CALL Scott Abreu RIGHT NOW TO PROVIDE HILDA'S EMAIL TO THEM  PT IS REALLY WORRIED STATED FORMS NEED TO BE SENT TO Scott Abreu BY 11/4/19 IF NOT THIS CAN RESULT IN HER LOOSING HER JOB & DISABILITY  PT STATED SHE HAS BEEN TRYING REALLY HARD EVERY SINGLE DAY TO TRY TO GET THIS ISSUE RESOLVED IT IS REALLY STRANGE THAT WE ARE HAVING ISSUES RECEIVING THESE FORMS IF WE HAVE RECEIVED FORMS FROM THEM BEFORE  Madeleine MARR TO COMPLETE THESE FORMS FOR HER TODAY PT STATED DR MARR HAS KNOWN HER FOR OVER 10 YEARS AND SHE KNOWS ALL ABOUT HER  WHEN FORMS ARE RECEIVED PT WOULD LIKE CALL BACK [phone] ASAP TO INFORM HER FORMS HAVE BEEN RECEIVED  PT NEEDS FORMS COMPLETED TODAY   & RESENT TO Scott Abreu SO THAT SHE CAN HAVE A "PEACEFUL WEEKEND"

## 2019-11-01 NOTE — TELEPHONE ENCOUNTER
FORMS RECEIVED FROM [employer] VIA HILDA'S EMAIL FORMS GIVEN TO DR  Eleanor Radon   CALLED PATIENT TO INFORM FORMS WERE RECEIVED  PT REQUESTED FOR FORMS TO BE FAXED BACK TO [employer] & PATIENT WOULD LIKE A CALL BACK WHEN FORMS HAVE BEEN COMPLETED & FAXED BACK TO [employer]  PT ALSO HAD A QUESTION FOR DR Eleanor Colon  PT STATED SHE WAS SCHEDULED FOR OV VISIT WITH [facility] ON 10/29/19 SHE HAD AN APPOINTMENT AT [facility] WITH THE SURGEON ON THE SAME DAY & WAS THERE FROM 10AM-5PM  PATIENT STATED SHE LIVES AN HOUR AWAY AND IS STILL ADJUSTING TO EVERYTHING THAT HAS HAPPENED TO HER, WOULD LIKE TO KNOW IF DR MARR STILL NEEDS TO SEE HER & IF OV WILL NEED TO BE RESCHEDULED   PLEASE CALL PATIENT BACK [phone] TO FOLLOW UP

## 2019-11-04 ENCOUNTER — TELEPHONE (OUTPATIENT)
Dept: FAMILY MEDICINE CLINIC | Facility: CLINIC | Age: 54
End: 2019-11-04

## 2019-11-04 NOTE — TELEPHONE ENCOUNTER
I spoke to our  and this information was completed and faxed and she was told by Aaron Ritter that it was complete

## 2019-11-04 NOTE — TELEPHONE ENCOUNTER
Left vm to inform pt that yes Dr Lilian Teresa would like to see her this month for a visit  Told her to call to schedule

## 2019-11-29 ENCOUNTER — OFFICE VISIT (OUTPATIENT)
Dept: FAMILY MEDICINE CLINIC | Facility: CLINIC | Age: 54
End: 2019-11-29
Payer: COMMERCIAL

## 2019-11-29 VITALS
BODY MASS INDEX: 34.89 KG/M2 | HEART RATE: 90 BPM | WEIGHT: 204.4 LBS | TEMPERATURE: 97.6 F | RESPIRATION RATE: 16 BRPM | SYSTOLIC BLOOD PRESSURE: 132 MMHG | DIASTOLIC BLOOD PRESSURE: 88 MMHG | HEIGHT: 64 IN

## 2019-11-29 DIAGNOSIS — E55.9 VITAMIN D DEFICIENCY: ICD-10-CM

## 2019-11-29 DIAGNOSIS — D50.9 IRON DEFICIENCY ANEMIA, UNSPECIFIED IRON DEFICIENCY ANEMIA TYPE: ICD-10-CM

## 2019-11-29 DIAGNOSIS — Z11.4 SCREENING FOR HIV (HUMAN IMMUNODEFICIENCY VIRUS): ICD-10-CM

## 2019-11-29 DIAGNOSIS — K51.00 PANCOLITIS (HCC): Primary | ICD-10-CM

## 2019-11-29 PROBLEM — E66.01 MORBID OBESITY WITH BMI OF 40.0-44.9, ADULT (HCC): Status: RESOLVED | Noted: 2017-09-18 | Resolved: 2019-11-29

## 2019-11-29 PROBLEM — T81.43XA POSTPROCEDURAL INTRAABDOMINAL ABSCESS: Status: ACTIVE | Noted: 2019-10-06

## 2019-11-29 PROCEDURE — 3008F BODY MASS INDEX DOCD: CPT | Performed by: FAMILY MEDICINE

## 2019-11-29 PROCEDURE — 99214 OFFICE O/P EST MOD 30 MIN: CPT | Performed by: FAMILY MEDICINE

## 2019-11-29 PROCEDURE — 1036F TOBACCO NON-USER: CPT | Performed by: FAMILY MEDICINE

## 2019-11-29 RX ORDER — FERROUS SULFATE 325(65) MG
325 TABLET ORAL
COMMUNITY
End: 2020-02-05 | Stop reason: CLARIF

## 2019-11-29 NOTE — LETTER
November 29, 2019     Patient: Herbert Nino   YOB: 1965   Date of Visit: 11/29/2019       To Whom it May Concern:    Kylah Mario is under my professional care  She was seen in my office on 11/29/2019  She may return to work with limitations 1/6/20 16 hours/week, 1/13/20 24 hours/week, 1/20/19 32 hours/week and then 1/27 can return to work full time  If you have any questions or concerns, please don't hesitate to call           Sincerely,          Lily Neves MD        CC: No Recipients

## 2019-11-29 NOTE — ASSESSMENT & PLAN NOTE
Was in hospital and so much disease whole colon had to be removed and then developed abscess after surgery,has ileostomy

## 2019-11-29 NOTE — PATIENT INSTRUCTIONS
Pt  Has follow up with surgery 12/18 and needs repeat lab  Obesity   AMBULATORY CARE:   Obesity  is when your body mass index (BMI) is greater than 30  Your healthcare provider will use your height and weight to measure your BMI  The risks of obesity include  many health problems, such as injuries or physical disability  You may need tests to check for the following:  · Diabetes     · High blood pressure or high cholesterol     · Heart disease     · Gallbladder or liver disease     · Cancer of the colon, breast, prostate, liver, or kidney     · Sleep apnea     · Arthritis or gout  Seek care immediately if:   · You have a severe headache, confusion, or difficulty speaking  · You have weakness on one side of your body  · You have chest pain, sweating, or shortness of breath  Contact your healthcare provider if:   · You have symptoms of gallbladder or liver disease, such as pain in your upper abdomen  · You have knee or hip pain and discomfort while walking  · You have symptoms of diabetes, such as intense hunger and thirst, and frequent urination  · You have symptoms of sleep apnea, such as snoring or daytime sleepiness  · You have questions or concerns about your condition or care  Treatment for obesity  focuses on helping you lose weight to improve your health  Even a small decrease in BMI can reduce the risk for many health problems  Your healthcare provider will help you set a weight-loss goal   · Lifestyle changes  are the first step in treating obesity  These include making healthy food choices and getting regular physical activity  Your healthcare provider may suggest a weight-loss program that involves coaching, education, and therapy  · Medicine  may help you lose weight when it is used with a healthy diet and physical activity  · Surgery  can help you lose weight if you are very obese and have other health problems  There are several types of weight-loss surgery   Ask your healthcare provider for more information  Be successful losing weight:   · Set small, realistic goals  An example of a small goal is to walk for 20 minutes 5 days a week  Anther goal is to lose 5% of your body weight  · Tell friends, family members, and coworkers about your goals  and ask for their support  Ask a friend to lose weight with you, or join a weight-loss support group  · Identify foods or triggers that may cause you to overeat , and find ways to avoid them  Remove tempting high-calorie foods from your home and workplace  Place a bowl of fresh fruit on your kitchen counter  If stress causes you to eat, then find other ways to cope with stress  · Keep a diary to track what you eat and drink  Also write down how many minutes of physical activity you do each day  Weigh yourself once a week and record it in your diary  Eating changes: You will need to eat 500 to 1,000 fewer calories each day than you currently eat to lose 1 to 2 pounds a week  The following changes will help you cut calories:  · Eat smaller portions  Use small plates, no larger than 9 inches in diameter  Fill your plate half full of fruits and vegetables  Measure your food using measuring cups until you know what a serving size looks like  · Eat 3 meals and 1 or 2 snacks each day  Plan your meals in advance  [place] and eat at home most of the time  Eat slowly  · Eat fruits and vegetables at every meal   They are low in calories and high in fiber, which makes you feel full  Do not add butter, margarine, or cream sauce to vegetables  Use herbs to season steamed vegetables  · Eat less fat and fewer fried foods  Eat more baked or grilled chicken and fish  These protein sources are lower in calories and fat than red meat  Limit fast food  Dress your salads with olive oil and vinegar instead of bottled dressing  · Limit the amount of sugar you eat  Do not drink sugary beverages  Limit alcohol    Activity changes: Physical activity is good for your body in many ways  It helps you burn calories and build strong muscles  It decreases stress and depression, and improves your mood  It can also help you sleep better  Talk to your healthcare provider before you begin an exercise program   · Exercise for at least 30 minutes 5 days a week  Start slowly  Set aside time each day for physical activity that you enjoy and that is convenient for you  It is best to do both weight training and an activity that increases your heart rate, such as walking, bicycling, or swimming  · Find ways to be more active  Do yard work and housecleaning  Walk up the stairs instead of using elevators  Spend your leisure time going to events that require walking, such as outdoor festivals or fairs  This extra physical activity can help you lose weight and keep it off  Follow up with your healthcare provider as directed: You may need to meet with a dietitian  Write down your questions so you remember to ask them during your visits  © 2017 [employer] Information is for End User's use only and may not be sold, redistributed or otherwise used for commercial purposes  All illustrations and images included in CareNotes® are the copyrighted property of [employer]  or [employer] [place]  The above information is an  only  It is not intended as medical advice for individual conditions or treatments  Talk to your doctor, nurse or pharmacist before following any medical regimen to see if it is safe and effective for you  Weight Management   AMBULATORY CARE:   Why it is important to manage your weight:  Being overweight increases your risk of health conditions such as heart disease, high blood pressure, type 2 diabetes, and certain types of cancer  It can also increase your risk for osteoarthritis, sleep apnea, and other respiratory problems  Aim for a slow, steady weight loss   Even a small amount of weight loss can lower your risk of health problems  How to lose weight safely:  A safe and healthy way to lose weight is to eat fewer calories and get regular exercise  You can lose up about 1 pound a week by decreasing the number of calories you eat by 500 calories each day  You can decrease calories by eating smaller portion sizes or by cutting out high-calorie foods  Read labels to find out how many calories are in the foods you eat  You can also burn calories with exercise such as walking, swimming, or biking  You will be more likely to keep weight off if you make these changes part of your lifestyle  Healthy meal plan for weight management:  A healthy meal plan includes a variety of foods, contains fewer calories, and helps you stay healthy  A healthy meal plan includes the following:  · Eat whole-grain foods more often  A healthy meal plan should contain fiber  Fiber is the part of grains, fruits, and vegetables that is not broken down by your body  Whole-grain foods are healthy and provide extra fiber in your diet  Some examples of whole-grain foods are whole-wheat breads and pastas, oatmeal, brown rice, and bulgur  · Eat a variety of vegetables every day  Include dark, leafy greens such as spinach, kale, devonte greens, and mustard greens  Eat yellow and orange vegetables such as carrots, sweet potatoes, and winter squash  · Eat a variety of fruits every day  Choose fresh or canned fruit (canned in its own juice or light syrup) instead of juice  Fruit juice has very little or no fiber  · Eat low-fat dairy foods  Drink fat-free (skim) milk or 1% milk  Eat fat-free yogurt and low-fat cottage cheese  Try low-fat cheeses such as mozzarella and other reduced-fat cheeses  · Choose meat and other protein foods that are low in fat  Choose beans or other legumes such as split peas or lentils  Choose fish, skinless poultry (chicken or turkey), or lean cuts of red meat (beef or pork)   Before you cook meat or poultry, cut off any visible fat  · Use less fat and oil  Try baking foods instead of frying them  Add less fat, such as margarine, sour cream, regular salad dressing and mayonnaise to foods  Eat fewer high-fat foods  Some examples of high-fat foods include french fries, doughnuts, ice cream, and cakes  · Eat fewer sweets  Limit foods and drinks that are high in sugar  This includes candy, cookies, regular soda, and sweetened drinks  Ways to decrease calories:   · Eat smaller portions  ¨ Use a small plate with smaller servings  ¨ Do not eat second helpings  ¨ When you eat at a restaurant, ask for a box and place half of your meal in the box before you eat  ¨ Share an entrée with someone else  · Replace high-calorie snacks with healthy, low-calorie snacks  ¨ Choose fresh fruit, vegetables, fat-free rice cakes, or air-popped popcorn instead of potato chips, nuts, or chocolate  ¨ Choose water or calorie-free drinks instead of soda or sweetened drinks  · Eat regular meals  Skipping meals can lead to overeating later in the day  Eat a healthy snack in place of a meal if you do not have time to eat a regular meal      · Do not shop for groceries when you are hungry  You may be more likely to make unhealthy food choices  Take a grocery list of healthy foods and shop after you have eaten  Exercise:  Exercise at least 30 minutes per day on most days of the week  Some examples of exercise include walking, biking, dancing, and swimming  You can also fit in more physical activity by taking the stairs instead of the elevator or parking farther away from stores  Ask your healthcare provider about the best exercise plan for you  Other things to consider as you try to lose weight:   · Be aware of situations that may give you the urge to overeat, such as eating while watching television  Find ways to avoid these situations  For example, read a book, go for a walk, or do crafts      · Meet with a weight loss support group or friends who are also trying to lose weight  This may help you stay motivated to continue working on your weight loss goals  © 2017 [employer] Information is for End User's use only and may not be sold, redistributed or otherwise used for commercial purposes  All illustrations and images included in CareNotes® are the copyrighted property of [employer]  or [employer] [place]  The above information is an  only  It is not intended as medical advice for individual conditions or treatments  Talk to your doctor, nurse or pharmacist before following any medical regimen to see if it is safe and effective for you

## 2019-11-29 NOTE — PROGRESS NOTES
Assessment and Plan:    Problem List Items Addressed This Visit        Digestive    Pancolitis [facility]) - Primary     Was in hospital and so much disease whole colon had to be removed and then developed abscess after surgery, has ileostomy                      Diagnoses and all orders for this visit:    Pancolitis ([address] )    Other orders  -     ferrous sulfate 325 (65 Fe) mg tablet; Take 325 mg by mouth daily with breakfast              Subjective:      Patient ID: Jacqui Dunne is a 48 y o  female  CC:    Chief Complaint   Patient presents with    Follow-up     pt says she has not been here since her surgeries       HPI:    Hospital follow up after 2 surgeries and 3 admits( one for partial bowel obstruction treated medically) , strength slowly coming back, therapy and home health aide is done, still has pain and still tired, blood count had been down to 8 7 and needs to be rechecked,seeing surgeon 12/18, needs f/u lab      The following portions of the patient's history were reviewed and updated as appropriate: allergies, current medications, past family history, past medical history, past social history, past surgical history and problem list         Review of Systems   Constitutional: Positive for fatigue, fever and unexpected weight change  Negative for activity change and appetite change  25 lb loss   Respiratory: Negative for shortness of breath  Cardiovascular: Negative for chest pain  Gastrointestinal: Positive for abdominal pain and nausea  Negative for blood in stool, constipation, diarrhea, rectal pain and vomiting  Genitourinary: Negative for difficulty urinating  Neurological: Positive for weakness  Psychiatric/Behavioral: The patient is nervous/anxious            Data to review:       Objective:    Vitals:    11/29/19 1155   BP: 132/88   BP Location: Left arm   Patient Position: Sitting   Cuff Size: Adult   Pulse: 90   Resp: 16   Temp: 97 6 °F (36 4 °C)   TempSrc: Temporal   Weight: 92 7 kg (204 lb 6 4 oz)   Height: 5' 4" (1 626 m)        Physical Exam   Constitutional: She appears well-developed and well-nourished  Neck: No thyromegaly present  Cardiovascular: Normal rate, regular rhythm and normal heart sounds  Pulmonary/Chest: Effort normal and breath sounds normal    Abdominal: Soft  Bowel sounds are normal  She exhibits no distension  There is tenderness  There is no guarding  Some tenderness over incision site   Musculoskeletal: She exhibits no edema  Lymphadenopathy:     She has no cervical adenopathy  Skin: No rash noted  No erythema  Psychiatric: She has a normal mood and affect  Vitals reviewed  BMI Counseling: Body mass index is 35 09 kg/m²  The BMI is above normal  Nutrition recommendations include reducing portion sizes, decreasing overall calorie intake, 3-5 servings of fruits/vegetables daily, reducing fast food intake and consuming healthier snacks  Exercise recommendations include exercising 3-5 times per week

## 2019-11-30 DIAGNOSIS — F41.9 ANXIETY: ICD-10-CM

## 2019-12-01 RX ORDER — LORAZEPAM 1 MG/1
TABLET ORAL
Qty: 60 TABLET | Refills: 0 | Status: SHIPPED | OUTPATIENT
Start: 2019-12-01 | End: 2020-02-05 | Stop reason: SDUPTHER

## 2020-01-06 ENCOUNTER — TELEPHONE (OUTPATIENT)
Dept: FAMILY MEDICINE CLINIC | Facility: CLINIC | Age: 55
End: 2020-01-06

## 2020-01-06 NOTE — TELEPHONE ENCOUNTER
Brandt White from [address] [place] [address] called for verbal orders for patients ostomy supplies     10 Flex wafers  1 box of gloves  22 piece pouch    Please call to approve:  [phone]   Ref patients name and

## 2020-01-07 ENCOUNTER — TELEPHONE (OUTPATIENT)
Dept: FAMILY MEDICINE CLINIC | Facility: CLINIC | Age: 55
End: 2020-01-07

## 2020-01-07 NOTE — TELEPHONE ENCOUNTER
PATIENT CALLED IN WANTED TO CONFIRM IF WE RECEIVED LAB WORK RESULTS THAT WERE FAXED FROM [facility] ADVISED THEY WERE RECEIVED 1/6/2020   PATIENT WANTED ONE OF OUR NURSES TO REVIEW RESULTS WITH HER, ADVISED UNFORTUNATELY SINCE LABS WERE NOT ORDERED BY DR Link Fuentes WE CANNOT DISCUSS THE RESULTS WITH HER SHE WILL NEED TO CONTACT DR ALEJANDRE'S OFFICE

## 2020-01-15 NOTE — TELEPHONE ENCOUNTER
I contacted [facility] and they stated someone already called with a Verbal  I asked they fax us the information that they have so we can keep this on file

## 2020-01-23 ENCOUNTER — TELEPHONE (OUTPATIENT)
Dept: FAMILY MEDICINE CLINIC | Facility: CLINIC | Age: 55
End: 2020-01-23

## 2020-01-23 NOTE — TELEPHONE ENCOUNTER
PATIENT CALLED WITH MULTIPLE QUESTIONS  PATIENT IS CONCERNED, STARTED THE BEGINNING OF January, PATIENT IS LOSING CLUMPS OF HAIR-TONGUE IS NUMB-SHE HAS NO TASTE UNTIL FOOD REACHES THE BACK OF HER TONGUE-HER LIPS ARE SUPER SORE  STATES SHE HAD BOWEL SURGERY 09/24/2019 AND WAS IN THE HOSPITAL FOR ONE AND A HALF MONTHS  SHE IS WONDERING IF THIS COULD BE AN AFTER EFFECT OF ALL OF THE TRAUMA HER BODY HAS BEEN THROUGH? SHE WOULD LIKE MEDICAL ADVICE AND IS WONDERING IF SHE NEEDS TO BE SEEN  STATES SHE LIVES ONE HOUR AWAY   PLEASE CALL PATIENT TO ADVISE

## 2020-02-05 ENCOUNTER — OFFICE VISIT (OUTPATIENT)
Dept: FAMILY MEDICINE CLINIC | Facility: CLINIC | Age: 55
End: 2020-02-05
Payer: COMMERCIAL

## 2020-02-05 VITALS
DIASTOLIC BLOOD PRESSURE: 72 MMHG | SYSTOLIC BLOOD PRESSURE: 112 MMHG | HEART RATE: 74 BPM | RESPIRATION RATE: 16 BRPM | WEIGHT: 210 LBS | HEIGHT: 64 IN | BODY MASS INDEX: 35.85 KG/M2

## 2020-02-05 DIAGNOSIS — L65.9 HAIR LOSS: ICD-10-CM

## 2020-02-05 DIAGNOSIS — R43.2 ALTERED TASTE: Primary | ICD-10-CM

## 2020-02-05 DIAGNOSIS — E55.9 VITAMIN D DEFICIENCY: ICD-10-CM

## 2020-02-05 DIAGNOSIS — D50.9 MICROCYTIC ANEMIA: ICD-10-CM

## 2020-02-05 DIAGNOSIS — F41.9 ANXIETY: ICD-10-CM

## 2020-02-05 DIAGNOSIS — K51.00 PANCOLITIS (HCC): ICD-10-CM

## 2020-02-05 DIAGNOSIS — E78.49 OTHER HYPERLIPIDEMIA: ICD-10-CM

## 2020-02-05 DIAGNOSIS — Z93.2 ILEOSTOMY PRESENT (HCC): ICD-10-CM

## 2020-02-05 DIAGNOSIS — R20.0 NUMBNESS AROUND MOUTH: ICD-10-CM

## 2020-02-05 PROBLEM — T81.43XA POSTPROCEDURAL INTRAABDOMINAL ABSCESS: Status: RESOLVED | Noted: 2019-10-06 | Resolved: 2020-02-05

## 2020-02-05 PROCEDURE — 3008F BODY MASS INDEX DOCD: CPT | Performed by: FAMILY MEDICINE

## 2020-02-05 PROCEDURE — 99214 OFFICE O/P EST MOD 30 MIN: CPT | Performed by: FAMILY MEDICINE

## 2020-02-05 PROCEDURE — 1036F TOBACCO NON-USER: CPT | Performed by: FAMILY MEDICINE

## 2020-02-13 ENCOUNTER — TELEPHONE (OUTPATIENT)
Dept: FAMILY MEDICINE CLINIC | Facility: CLINIC | Age: 55
End: 2020-02-13

## 2020-02-13 NOTE — TELEPHONE ENCOUNTER
Spoke to Mode at [employer] and ordered Adhesive remover for the patient  Patient aware  Patient also was asking about blood work results that she had drawn last week  I got results faxed to us by [facility] in [place]  Nothing was in Epic other than the B6  Faxed results to [facility] so they can be part of patients chart and we can call her with those results

## 2020-02-13 NOTE — TELEPHONE ENCOUNTER
Pt called in and stated she needs dr Zonia Gaitan to call in to [facility] to approve adhesive removal for pt  CALL [phone] thank you!

## 2020-02-21 ENCOUNTER — OFFICE VISIT (OUTPATIENT)
Dept: FAMILY MEDICINE CLINIC | Facility: CLINIC | Age: 55
End: 2020-02-21
Payer: COMMERCIAL

## 2020-02-21 VITALS
SYSTOLIC BLOOD PRESSURE: 130 MMHG | TEMPERATURE: 97.9 F | RESPIRATION RATE: 16 BRPM | DIASTOLIC BLOOD PRESSURE: 82 MMHG | HEART RATE: 86 BPM | HEIGHT: 64 IN | BODY MASS INDEX: 35.92 KG/M2 | WEIGHT: 210.4 LBS

## 2020-02-21 DIAGNOSIS — L65.9 HAIR LOSS: Primary | ICD-10-CM

## 2020-02-21 DIAGNOSIS — M79.671 PAIN IN BOTH FEET: ICD-10-CM

## 2020-02-21 DIAGNOSIS — M79.672 PAIN IN BOTH FEET: ICD-10-CM

## 2020-02-21 DIAGNOSIS — E55.9 VITAMIN D DEFICIENCY: ICD-10-CM

## 2020-02-21 DIAGNOSIS — D50.9 IRON DEFICIENCY ANEMIA, UNSPECIFIED IRON DEFICIENCY ANEMIA TYPE: ICD-10-CM

## 2020-02-21 DIAGNOSIS — E78.49 OTHER HYPERLIPIDEMIA: ICD-10-CM

## 2020-02-21 DIAGNOSIS — E53.8 LOW VITAMIN B12 LEVEL: ICD-10-CM

## 2020-02-21 PROCEDURE — 1036F TOBACCO NON-USER: CPT | Performed by: FAMILY MEDICINE

## 2020-02-21 PROCEDURE — 99214 OFFICE O/P EST MOD 30 MIN: CPT | Performed by: FAMILY MEDICINE

## 2020-02-21 RX ORDER — CYANOCOBALAMIN 1000 UG/ML
1000 INJECTION INTRAMUSCULAR; SUBCUTANEOUS ONCE
Status: COMPLETED | OUTPATIENT
Start: 2020-02-21 | End: 2020-02-21

## 2020-02-21 RX ORDER — FERROUS SULFATE 7.5 MG/0.5
15 SYRINGE (EA) ORAL DAILY
Qty: 100 ML | Refills: 5 | Status: SHIPPED | OUTPATIENT
Start: 2020-02-21 | End: 2020-05-01 | Stop reason: CLARIF

## 2020-02-21 RX ADMIN — CYANOCOBALAMIN 1000 MCG: 1000 INJECTION INTRAMUSCULAR; SUBCUTANEOUS at 15:58

## 2020-02-21 NOTE — PROGRESS NOTES
Assessment and Plan:    Problem List Items Addressed This Visit     None                 There are no diagnoses linked to this encounter  Subjective:      Patient ID: Viktor Chavira is a 48 y o  female  CC:    Chief Complaint   Patient presents with    Results       HPI:    B12 slightly low, d slightly low, still very tired, reviewed TSH as nl-still concerned thyroid is "off"      The following portions of the patient's history were reviewed and updated as appropriate: allergies, current medications, past family history, past medical history, past social history, past surgical history and problem list       Review of Systems   Constitutional: Positive for fatigue  Negative for activity change and appetite change  Respiratory: Negative for shortness of breath  Cardiovascular: Negative for chest pain  Musculoskeletal: Positive for arthralgias  Feet and ankles hurt   Neurological: Negative for dizziness and headaches  Data to review:       Objective:    Vitals:    02/21/20 1500   BP: 130/82   BP Location: Left arm   Patient Position: Sitting   Cuff Size: Adult   Pulse: 86   Resp: 16   Temp: 97 9 °F (36 6 °C)   TempSrc: Temporal   Weight: 95 4 kg (210 lb 6 4 oz)   Height: 5' 4" (1 626 m)        Physical Exam   Constitutional: She appears well-developed and well-nourished  Neck: No thyromegaly present  Cardiovascular: Normal rate, regular rhythm and normal heart sounds  Pulmonary/Chest: Effort normal and breath sounds normal    Musculoskeletal: She exhibits tenderness  She exhibits no edema  Right foot: There is tenderness  Left foot: There is tenderness  Lymphadenopathy:     She has no cervical adenopathy  Psychiatric: She has a normal mood and affect  Vitals reviewed

## 2020-03-16 DIAGNOSIS — F41.9 ANXIETY: ICD-10-CM

## 2020-03-16 RX ORDER — LORAZEPAM 1 MG/1
TABLET ORAL
Qty: 60 TABLET | Refills: 2 | Status: SHIPPED | OUTPATIENT
Start: 2020-03-16

## 2020-05-01 ENCOUNTER — TELEMEDICINE (OUTPATIENT)
Dept: FAMILY MEDICINE CLINIC | Facility: CLINIC | Age: 55
End: 2020-05-01
Payer: COMMERCIAL

## 2020-05-01 ENCOUNTER — TELEPHONE (OUTPATIENT)
Dept: OTHER | Facility: OTHER | Age: 55
End: 2020-05-01

## 2020-05-01 VITALS — WEIGHT: 219 LBS | HEIGHT: 64 IN | BODY MASS INDEX: 37.39 KG/M2

## 2020-05-01 DIAGNOSIS — K12.1 STOMATITIS: Primary | ICD-10-CM

## 2020-05-01 DIAGNOSIS — Z12.31 ENCOUNTER FOR SCREENING MAMMOGRAM FOR BREAST CANCER: ICD-10-CM

## 2020-05-01 PROCEDURE — 3008F BODY MASS INDEX DOCD: CPT | Performed by: FAMILY MEDICINE

## 2020-05-01 PROCEDURE — 99213 OFFICE O/P EST LOW 20 MIN: CPT | Performed by: FAMILY MEDICINE

## 2020-05-04 DIAGNOSIS — K12.1 STOMATITIS: ICD-10-CM

## 2020-05-21 ENCOUNTER — TELEPHONE (OUTPATIENT)
Dept: FAMILY MEDICINE CLINIC | Facility: CLINIC | Age: 55
End: 2020-05-21

## 2020-10-01 DIAGNOSIS — F41.9 ANXIETY: ICD-10-CM

## 2020-10-01 RX ORDER — LORAZEPAM 1 MG/1
1 TABLET ORAL
Qty: 60 TABLET | Refills: 0 | OUTPATIENT
Start: 2020-10-01

## 2020-11-22 NOTE — TELEPHONE ENCOUNTER
OK for supplies  Dx: Pancollitis, and Illeostomy  Cardiac Monitor/Defib/ACLS/Rescue Kit/O2/BVM/ACLS Rescue Kit

## 2022-02-26 NOTE — TELEPHONE ENCOUNTER
FYI - PT WAS ADMITTED TO [facility] ON 9/9/19 AND IS STILL IN AND WILL PROBABLY BE IN ALL WEEK  Iwona MARR TO CALL HER SOMETIME PLEASE  THANK YOU  PT IS IN FOR FLARE-UP OF HER COLITIS  [facility] THINKS SHE HAS CROHN'S DISEASE AS WELL  Private car

## 2022-08-17 NOTE — TELEPHONE ENCOUNTER
Patient stopped into the office  She dropped off GI office note for you to review  She stated she has been very ill and is filing for disability claim  She goes for a colonoscopy and endoscopy on 08/09/2018  She would like to personally speak with you regarding her care  Please call her at [phone]  Note will be in your misc bin to review  Nsaids Pregnancy And Lactation Text: These medications are considered safe up to 30 weeks gestation. It is excreted in breast milk.

## 2024-06-05 ENCOUNTER — TELEPHONE (OUTPATIENT)
Dept: FAMILY MEDICINE CLINIC | Facility: CLINIC | Age: 59
End: 2024-06-05

## 2024-06-05 NOTE — TELEPHONE ENCOUNTER
On 5/31/2024 patient established are with Moreno Hardin PA-C (May 31, 2024May 31, 2024 - Present)  NPI: [id number]  [phone] (Work)  [phone] (Fax)  [address] [facility] [address]  [place] [address]  Family Medicine  [facility]

## 2024-06-07 NOTE — TELEPHONE ENCOUNTER
06/07/24 8:25 AM        The office's request has been received, reviewed, and the patient chart updated. The PCP has successfully been removed with a patient attribution note. This message will now be completed.        Thank you  Rafaela Krishna